# Patient Record
Sex: FEMALE | Race: ASIAN | NOT HISPANIC OR LATINO | Employment: FULL TIME | URBAN - METROPOLITAN AREA
[De-identification: names, ages, dates, MRNs, and addresses within clinical notes are randomized per-mention and may not be internally consistent; named-entity substitution may affect disease eponyms.]

---

## 2020-09-20 ASSESSMENT — ANXIETY QUESTIONNAIRES
7. FEELING AFRAID AS IF SOMETHING AWFUL MIGHT HAPPEN: NOT AT ALL
7. FEELING AFRAID AS IF SOMETHING AWFUL MIGHT HAPPEN: NOT AT ALL
3. WORRYING TOO MUCH ABOUT DIFFERENT THINGS: NOT AT ALL
GAD7 TOTAL SCORE: 2
5. BEING SO RESTLESS THAT IT IS HARD TO SIT STILL: NOT AT ALL
2. NOT BEING ABLE TO STOP OR CONTROL WORRYING: NOT AT ALL
1. FEELING NERVOUS, ANXIOUS, OR ON EDGE: SEVERAL DAYS
4. TROUBLE RELAXING: NOT AT ALL
6. BECOMING EASILY ANNOYED OR IRRITABLE: SEVERAL DAYS
GAD7 TOTAL SCORE: 2

## 2020-09-21 ENCOUNTER — OFFICE VISIT (OUTPATIENT)
Dept: FAMILY MEDICINE | Facility: CLINIC | Age: 30
End: 2020-09-21
Payer: COMMERCIAL

## 2020-09-21 VITALS
OXYGEN SATURATION: 97 % | TEMPERATURE: 98.5 F | DIASTOLIC BLOOD PRESSURE: 89 MMHG | SYSTOLIC BLOOD PRESSURE: 152 MMHG | HEART RATE: 82 BPM

## 2020-09-21 DIAGNOSIS — Z23 NEEDS FLU SHOT: ICD-10-CM

## 2020-09-21 DIAGNOSIS — R10.2 PELVIC PAIN IN FEMALE: Primary | ICD-10-CM

## 2020-09-21 DIAGNOSIS — Z30.09 COUNSELING FOR BIRTH CONTROL, ORAL CONTRACEPTIVES: ICD-10-CM

## 2020-09-21 RX ORDER — NAPROXEN 500 MG/1
TABLET ORAL
COMMUNITY
Start: 2017-06-01

## 2020-09-21 RX ORDER — MECLIZINE HCL 12.5 MG 12.5 MG/1
TABLET ORAL
COMMUNITY
Start: 2017-06-01

## 2020-09-21 SDOH — HEALTH STABILITY: MENTAL HEALTH: HOW OFTEN DO YOU HAVE A DRINK CONTAINING ALCOHOL?: 4 OR MORE TIMES A WEEK

## 2020-09-21 SDOH — HEALTH STABILITY: MENTAL HEALTH: HOW MANY STANDARD DRINKS CONTAINING ALCOHOL DO YOU HAVE ON A TYPICAL DAY?: 1 OR 2

## 2020-09-21 SDOH — HEALTH STABILITY: MENTAL HEALTH: HOW OFTEN DO YOU HAVE 6 OR MORE DRINKS ON ONE OCCASION?: LESS THAN MONTHLY

## 2020-09-21 ASSESSMENT — ANXIETY QUESTIONNAIRES: GAD7 TOTAL SCORE: 2

## 2020-09-21 ASSESSMENT — ENCOUNTER SYMPTOMS: ABDOMINAL PAIN: 1

## 2020-09-21 ASSESSMENT — PAIN SCALES - GENERAL: PAINLEVEL: NO PAIN (0)

## 2020-09-21 NOTE — PROGRESS NOTES
HPI       Leonor Sears is a 29 year old woman who presents to [facility] and with concerns of abdominal pain/cramping 2 days prior to her period. Patient states this has occurred over the past 5-6 years. Takes Lo-overal. Cramping primarily occurs at night, patient treats with naproxen and heat packs. Pain lasts about two days and stops abruptly. She states this minimally helps and the pain usually leaves her unable to sleep or move easily. She has a regular 28 day cycle with 4-5 day long menses. Unremarkable pelvic US performed 5-6 years ago.   Chief Complaint   Patient presents with     [facility]       ABDOMINAL   Pain     Onset: 5-6 years ago    Description:   Character: Cramping  Location: right lower quadrant left lower quadrant  Radiation: None    Intensity: severe    Progression of Symptoms:  intermittent    Accompanying Signs & Symptoms:  Fever/Chills?: No   Gas/Bloating: No   Dysuria:No   Hematuria: No   Nausea: No   Vomiting: No   Diarrhea:No   Constipation: NO       History:           Trauma: No   Previous similar pain: No    Previous tests done: Vaginal US    What makes it better?:  Naproxen, heat occasionally    Therapies Tried and outcome: Naproxen, heat packs    LMP:  Beginning of August 2020     Adherence and Exercise  Medication side effects: no  How often is a medication missed? Never  Exercise:walking  1 day/week for an average of 15-30 minutes     Problem, Medication and Allergy Lists were reviewed and updated if needed..    Patient is a new patient to this clinic and so  I reviewed/updated the Past Medical History, the Family History and the Social History .           Review of Systems:   Review of Systems     Gastrointestinal:  Positive for abdominal pain.   All other systems reviewed and are negative.              Physical Exam:     Vitals:    09/21/20 1235   BP: (!) 152/89   Pulse: 82   Temp: 98.5  F (36.9  C)   TempSrc: Oral   SpO2: 97%     There is no  height or weight on file to calculate BMI.  Vital signs normal except /89     Physical Exam  HENT:      Head: Normocephalic.   Eyes:      Extraocular Movements: Extraocular movements intact.      Pupils: Pupils are equal, round, and reactive to light.   Neck:      Musculoskeletal: Normal range of motion and neck supple.   Cardiovascular:      Rate and Rhythm: Normal rate and regular rhythm.      Pulses: Normal pulses.      Heart sounds: Normal heart sounds.   Pulmonary:      Effort: Pulmonary effort is normal.      Breath sounds: Normal breath sounds.   Abdominal:      General: Bowel sounds are normal.      Palpations: Abdomen is soft.   Musculoskeletal: Normal range of motion.   Skin:     General: Skin is warm and dry.   Neurological:      General: No focal deficit present.      Mental Status: She is alert and oriented to person, place, and time.   Psychiatric:         Mood and Affect: Mood normal.         Behavior: Behavior normal.           Results:   US pending  Assessment and Plan     Leonor was seen today for establish care.    Diagnoses and all orders for this visit:    Pelvic pain in female  -     US Pelvic Complete with Transvaginal; Future    Needs flu shot  -     FLU VAC PRESRV FREE QUAD SPLIT VIR 3+YRS IM    Counseling for birth control, oral contraceptives  -     norgestrel-ethinyl estradiol (LO/OVRAL) 0.3-30 MG-MCG tablet; Take 1 tablet by mouth daily        Medications Discontinued During This Encounter   Medication Reason     norgestrel-ethinyl estradiol (LO/OVRAL) 0.3-30 MG-MCG tablet Reorder     Options for treatment and follow-up care were reviewed with the patient. Leonor Sears  engaged in the decision making process and verbalized understanding of the options discussed and agreed with the final plan.  Candida Gilliam RN. PHN, [facility] DNP student  I was present with the NP student who participated in the service and in the documentation of the services  provided. I have verified the history and personally performed the physical exam and medical decision making, as documented by the student and edited by me.  HERB Diamond, CNP

## 2020-09-21 NOTE — PATIENT INSTRUCTIONS

## 2020-09-21 NOTE — NURSING NOTE
Chief Complaint   Patient presents with     [facility]     Rebecca Escobedo, A 1:16 PM  9/21/2020

## 2020-09-22 ENCOUNTER — ANCILLARY PROCEDURE (OUTPATIENT)
Dept: ULTRASOUND IMAGING | Facility: CLINIC | Age: 30
End: 2020-09-22
Attending: NURSE PRACTITIONER
Payer: COMMERCIAL

## 2020-09-22 DIAGNOSIS — R10.2 PELVIC PAIN IN FEMALE: ICD-10-CM

## 2020-09-27 ENCOUNTER — MYC REFILL (OUTPATIENT)
Dept: FAMILY MEDICINE | Facility: CLINIC | Age: 30
End: 2020-09-27

## 2020-09-27 DIAGNOSIS — Z30.09 COUNSELING FOR BIRTH CONTROL, ORAL CONTRACEPTIVES: ICD-10-CM

## 2020-10-13 ENCOUNTER — VIRTUAL VISIT (OUTPATIENT)
Dept: INTERNAL MEDICINE | Facility: CLINIC | Age: 30
End: 2020-10-13
Payer: COMMERCIAL

## 2020-10-13 DIAGNOSIS — N94.6 DYSMENORRHEA: Primary | ICD-10-CM

## 2020-10-13 PROBLEM — M26.609 TMJ (TEMPOROMANDIBULAR JOINT SYNDROME): Status: ACTIVE | Noted: 2019-11-11

## 2020-10-13 PROCEDURE — 99203 OFFICE O/P NEW LOW 30 MIN: CPT | Mod: 95 | Performed by: PATHOLOGY

## 2020-10-13 RX ORDER — LEVONORGESTREL / ETHINYL ESTRADIOL AND ETHINYL ESTRADIOL 150-30(84)
1 KIT ORAL DAILY
Qty: 91 TABLET | Refills: 3 | Status: SHIPPED | OUTPATIENT
Start: 2020-10-13 | End: 2021-06-28 | Stop reason: ALTCHOICE

## 2020-10-13 NOTE — NURSING NOTE
Chief Complaint   Patient presents with     Refill Request     Pt would like refills of OCP     KRISHNA Tran at 8:02 AM sign on 10/13/2020

## 2020-10-13 NOTE — PROGRESS NOTES
"This patient is being evaluated via a billable video visit as an alternative to an in-person visit.       The patient has been notified of following:     \"This video visit will be conducted via a call between you and your physician/provider. We have found that certain health care needs can be provided without the need for an in-person physical exam.  This service lets us provide the care you need with a video conversation.  If a prescription is necessary we can send it directly to your pharmacy.  If lab work is needed we can place an order for that and you can then stop by our lab to have the test done at a later time. If during the course of the call the physician/provider feels a video visit is not appropriate, you will not be charged for this service.\"     Patient has given verbal consent for virtual video visit? Yes  Did patient initiate this virtual visit? Yes    Person spoken to:  Patient    This was a synchronous virtual visit  Location of patient: home  Location of physician:  home office  Department name:  Medicine  Mode of communication:  Video Conference via [employer]    Time video initiated:  8:25  Time video ended:  8:47  Total length of video visit: 22 mins    Patients: if you have questions or concerns about this progress note, please discuss them with the provider at at a future office visit.      [facility]         HPI:      Leonor Sears is a 29 year old female with history of frequent PVCs s/p ablation, dysmenorrhea, and migraine without aura who presents to discuss ongoing dysmenorrhea and OCP options.    Dysmenorrhea has been an issue for the past 5-6 years. Symptoms consist of pelvic pain/cramping starting 2-3 days prior to her period and stopping abruptly early on in her period (usually pain lasts 2 days total). Has been taking combo OCP Lo/Ovral which she is tolerating well but does not like the fact that she is still having dysmenorrhea. Patient treats cramps with naproxen 500 mg and " heat packs with some relief. The pain keeps her up at night and makes it difficult to function with daily activities. Cycles are regular 28 days with 4-5 days of menses. Low concern for STI, patient declines screening, no symptoms which would raise concern for STI. Pelvic US was unremarkable, last done on visit to NP 9/21/20 and also was normal when done 5-6 years ago for pelvic pain. Previous contraceptives used includes the mini pill which she was on for about 3-4 months, states that she did not like the stringency of having to take this on a very regular schedule. Tried the implant (nexplanon?) before and did not like this. Would consider an IUD but prefers to stay on an OCP if possible. She is a never smoker, no personal history of blood clots, not diagnosis of HTN, headaches are associated with period -- migraine vs tension headache -- and are without aura though does have associated photophobia.     Patient had no additional concerns.     Problem, Medication and Allergy Lists were reviewed and are current.  Patient is an established patient of this clinic.         Review of Systems:   10-point ROS was negative other than as per HPI.    ROS  I have personally reviewed and updated the complete ROS on the day of the visit.           Physical Exam:   This was a virtual visit.   No vitals were taken.      Video visit.   Patient sitting upright, conversant and extremely pleasant, appears comfortable and in no acute distress. Speech fluent.       Results:   Labs/tests ordered this encounter: none    Assessment and Plan     Leonor was seen today for dysmenorrhea and to discuss OCP options.    Diagnoses and all orders for this visit:    Dysmenorrhea  Will keep patient on combo OCP per her preference. Would likely have benefit from switching to an extended-cycle OCP; prescribed Levonorgestrel/ethinyl estradiol 0.15 mg/0.03 mg and patient can expect to have menses very 12 weeks rather than every 4 weeks. Given the  cyclic nature of the pelvic pain and timing correlating with onset of menses this could be PMS-type pain however have high suspicion that this could in fact be endometriosis. Transvaginal US done less than one month ago with no evidence of ovarian or bowel endometrioma, nor was uterus enlarged which would indicate adenomyosis, however cannot rule out deeply infiltrating endometriosis which would only be seen on MR imaging. Given recent normal TVUS will defer further workup for now and try switching OCP. Patient to follow up in 6-12 months or sooner if concerns, especially if there has been no improvement or worsening in pelvic pain.         -     Discontinue Lo/Ovral OCP  -     Start levonorgest-eth estrad 91-Day (SEASONIQUE) 0.15-0.03 &0.01 MG tablet; Take 1 tablet by mouth daily  -     Return to clinic in 6-12 months or sooner if needed    Options for treatment and follow-up care were reviewed with the patient. Leonor Sears engaged in the decision making process and verbalized understanding of the options discussed and agreed with the final plan.      Whitney Calix MD MPH, PGY-6  Internal Medicine  p:[phone]  October 13, 2020    Teaching Physician Note:    I, Dr. Kincaid, was immediately accessible to the resident, and agree with the residents's findings and plan of care, as documented in the resident's note.     Ellen Kincaid M.D.  Internal Medicine  [facility]         Patients: if you have questions or concerns about this progress note, please discuss them with the provider at at a future office visit.

## 2020-10-13 NOTE — PROGRESS NOTES
"Video Visit Technology for this patient: Juliann not working, patient has smart device, please try [employer] Video with patient [phone]     Leonor Sears is a 29 year old female who is being evaluated via a billable video visit.      The patient has been notified of following:     \"This video visit will be conducted via a call between you and your physician/provider. We have found that certain health care needs can be provided without the need for an in-person physical exam.  This service lets us provide the care you need with a video conversation.  If a prescription is necessary we can send it directly to your pharmacy.  If lab work is needed we can place an order for that and you can then stop by our lab to have the test done at a later time.    Video visits are billed at different rates depending on your insurance coverage.  Please reach out to your insurance provider with any questions.    If during the course of the call the physician/provider feels a video visit is not appropriate, you will not be charged for this service.\"    Patient has given verbal consent for Video visit? Yes  How would you like to obtain your AVS? MyChart  If you are dropped from the video visit, the video invite should be resent to: Text to cell phone: [phone]   Will anyone else be joining your video visit? No           "

## 2021-01-04 ENCOUNTER — HEALTH MAINTENANCE LETTER (OUTPATIENT)
Age: 31
End: 2021-01-04

## 2021-06-28 ENCOUNTER — MYC MEDICAL ADVICE (OUTPATIENT)
Dept: FAMILY MEDICINE | Facility: CLINIC | Age: 31
End: 2021-06-28

## 2021-06-28 ENCOUNTER — OFFICE VISIT (OUTPATIENT)
Dept: FAMILY MEDICINE | Facility: CLINIC | Age: 31
End: 2021-06-28
Payer: COMMERCIAL

## 2021-06-28 VITALS
SYSTOLIC BLOOD PRESSURE: 129 MMHG | HEIGHT: 63 IN | HEART RATE: 87 BPM | WEIGHT: 145.3 LBS | OXYGEN SATURATION: 98 % | DIASTOLIC BLOOD PRESSURE: 82 MMHG | BODY MASS INDEX: 25.75 KG/M2

## 2021-06-28 DIAGNOSIS — Z83.3 FAMILY HISTORY OF DIABETES MELLITUS: Primary | ICD-10-CM

## 2021-06-28 DIAGNOSIS — G43.009 MIGRAINE WITHOUT AURA AND WITHOUT STATUS MIGRAINOSUS, NOT INTRACTABLE: ICD-10-CM

## 2021-06-28 DIAGNOSIS — K21.00 GASTROESOPHAGEAL REFLUX DISEASE WITH ESOPHAGITIS WITHOUT HEMORRHAGE: ICD-10-CM

## 2021-06-28 DIAGNOSIS — K21.00 GASTROESOPHAGEAL REFLUX DISEASE WITH ESOPHAGITIS WITHOUT HEMORRHAGE: Primary | ICD-10-CM

## 2021-06-28 DIAGNOSIS — R03.0 ELEVATED BP WITHOUT DIAGNOSIS OF HYPERTENSION: ICD-10-CM

## 2021-06-28 DIAGNOSIS — Z30.09 ENCOUNTER FOR OTHER GENERAL COUNSELING OR ADVICE ON CONTRACEPTION: ICD-10-CM

## 2021-06-28 DIAGNOSIS — Z83.3 FAMILY HISTORY OF DIABETES MELLITUS: ICD-10-CM

## 2021-06-28 LAB
ALBUMIN SERPL-MCNC: 3.6 G/DL (ref 3.4–5)
ALP SERPL-CCNC: 30 U/L (ref 40–150)
ALT SERPL W P-5'-P-CCNC: 46 U/L (ref 0–50)
ANION GAP SERPL CALCULATED.3IONS-SCNC: 6 MMOL/L (ref 3–14)
AST SERPL W P-5'-P-CCNC: 38 U/L (ref 0–45)
BASOPHILS # BLD AUTO: 0 10E9/L (ref 0–0.2)
BASOPHILS NFR BLD AUTO: 0.2 %
BILIRUB SERPL-MCNC: 0.3 MG/DL (ref 0.2–1.3)
BUN SERPL-MCNC: 9 MG/DL (ref 7–30)
CALCIUM SERPL-MCNC: 8.6 MG/DL (ref 8.5–10.1)
CHLORIDE SERPL-SCNC: 106 MMOL/L (ref 94–109)
CO2 SERPL-SCNC: 24 MMOL/L (ref 20–32)
CREAT SERPL-MCNC: 0.8 MG/DL (ref 0.52–1.04)
DIFFERENTIAL METHOD BLD: ABNORMAL
EOSINOPHIL # BLD AUTO: 0.1 10E9/L (ref 0–0.7)
EOSINOPHIL NFR BLD AUTO: 1.7 %
ERYTHROCYTE [DISTWIDTH] IN BLOOD BY AUTOMATED COUNT: 12 % (ref 10–15)
GFR SERPL CREATININE-BSD FRML MDRD: >90 ML/MIN/{1.73_M2}
GLUCOSE SERPL-MCNC: 81 MG/DL (ref 70–99)
HCG UR QL: NEGATIVE
HCT VFR BLD AUTO: 44.8 % (ref 35–47)
HGB BLD-MCNC: 15.6 G/DL (ref 11.7–15.7)
IMM GRANULOCYTES # BLD: 0.1 10E9/L (ref 0–0.4)
IMM GRANULOCYTES NFR BLD: 1.3 %
LYMPHOCYTES # BLD AUTO: 0.9 10E9/L (ref 0.8–5.3)
LYMPHOCYTES NFR BLD AUTO: 19.9 %
MCH RBC QN AUTO: 33.4 PG (ref 26.5–33)
MCHC RBC AUTO-ENTMCNC: 34.8 G/DL (ref 31.5–36.5)
MCV RBC AUTO: 96 FL (ref 78–100)
MONOCYTES # BLD AUTO: 0.4 10E9/L (ref 0–1.3)
MONOCYTES NFR BLD AUTO: 7.9 %
NEUTROPHILS # BLD AUTO: 3.2 10E9/L (ref 1.6–8.3)
NEUTROPHILS NFR BLD AUTO: 69 %
NRBC # BLD AUTO: 0 10*3/UL
NRBC BLD AUTO-RTO: 0 /100
PLATELET # BLD AUTO: 145 10E9/L (ref 150–450)
POTASSIUM SERPL-SCNC: 3.8 MMOL/L (ref 3.4–5.3)
PROT SERPL-MCNC: 7.4 G/DL (ref 6.8–8.8)
RBC # BLD AUTO: 4.67 10E12/L (ref 3.8–5.2)
SODIUM SERPL-SCNC: 137 MMOL/L (ref 133–144)
WBC # BLD AUTO: 4.6 10E9/L (ref 4–11)

## 2021-06-28 PROCEDURE — 99204 OFFICE O/P NEW MOD 45 MIN: CPT | Performed by: FAMILY MEDICINE

## 2021-06-28 PROCEDURE — 85025 COMPLETE CBC W/AUTO DIFF WBC: CPT | Performed by: PATHOLOGY

## 2021-06-28 PROCEDURE — 36415 COLL VENOUS BLD VENIPUNCTURE: CPT | Performed by: PATHOLOGY

## 2021-06-28 PROCEDURE — 81025 URINE PREGNANCY TEST: CPT | Performed by: PATHOLOGY

## 2021-06-28 PROCEDURE — 80053 COMPREHEN METABOLIC PANEL: CPT | Performed by: PATHOLOGY

## 2021-06-28 RX ORDER — FAMOTIDINE 40 MG/1
40 TABLET, FILM COATED ORAL DAILY
Qty: 30 TABLET | Refills: 0 | Status: SHIPPED | OUTPATIENT
Start: 2021-06-28 | End: 2021-07-22

## 2021-06-28 RX ORDER — FLASH GLUCOSE SENSOR
1 KIT MISCELLANEOUS
Qty: 2 EACH | Refills: 5 | Status: SHIPPED | OUTPATIENT
Start: 2021-06-28 | End: 2021-06-30

## 2021-06-28 RX ORDER — ACETAMINOPHEN AND CODEINE PHOSPHATE 120; 12 MG/5ML; MG/5ML
0.35 SOLUTION ORAL DAILY
Qty: 90 TABLET | Refills: 3 | Status: SHIPPED | OUTPATIENT
Start: 2021-06-28

## 2021-06-28 RX ORDER — FLASH GLUCOSE SCANNING READER
1 EACH MISCELLANEOUS ONCE
Qty: 1 EACH | Refills: 0 | Status: SHIPPED | OUTPATIENT
Start: 2021-06-28 | End: 2021-06-30

## 2021-06-28 ASSESSMENT — MIFFLIN-ST. JEOR: SCORE: 1356.21

## 2021-06-28 NOTE — PATIENT INSTRUCTIONS
Patient Education     Birth Control Choices  Birth control keeps you from getting pregnant during sex. There are many types of birth control. Some are more effective than others. New types are being tested all the time. Your healthcare provider can help you decide which type of birth control is best for you. But no matter which type you choose, you and your partner must use it the right way each time you have sex. Some of the most common types are described below.  Condom  A condom is a thin covering that fits over the penis. (The female condom fits inside the vagina.) A condom catches sperm that come out of the penis during sex.  Spermicide  Spermicide is a gel, foam, cream, tablet, or sponge (although the sponge has barrier properties in addition to spermicidal properties). It is put in the vagina before sex to kill sperm.  Diaphragm and cervical cap  Diaphragms and cervical caps are round rubber cups that keep sperm out of the uterus. They also hold spermicide in place.  Intrauterine device (IUD)  An IUD is a small device that is placed in the uterus by a healthcare provider to prevent pregnancy.  The pill  The birth control pill is taken daily. It contains hormones that stop a woman s body from releasing an egg each month.  Other hormones  Hormones that stop a woman s egg from being released each month can be delivered in other ways. These include injection, implant, patch, or vaginal ring.  Other choices  Here are some other birth control methods:    Male sterilization (vasectomy). This is surgery that ties off or cuts the tubes (vas deferens) in the testes. This is done so sperm can't come out when the man ejaculates.    Female sterilization. This is surgery to block or cut the woman's fallopian tubes. It can be done by placing a tool into the uterus (hysteroscopy). This is done to place small coils into the fallopian tubes. The FDA has placed restrictions on this method. If you are interested in this  method, talk with your healthcare provider about possible risks. Female sterilization can also be done through the belly (laparoscopy) to block the tubes. Or to remove part or all of the tubes.    Withdrawal method. This is when the male doesn't ejaculate into the vagina. Instead he withdraws his penis just before he ejaculates. But the failure rate for this method ranges from 22% to 28%.    Fertility awareness method. This is when a woman keeps track of her fertile days. She only has sex at times when she is not likely to get pregnant. This method is hard for women who have irregular periods.  Emergency contraception (EC)  Emergency contraception can help prevent pregnancy after unprotected sex. Hormone pills (morning after pills) are available over the counter to anyone. A second type of EC, a copper IUD, needs to be inserted by a trained healthcare provider. Either type of EC can be used up to 5 days after sex. But it should be taken as soon as possible. The sooner it is used after unprotected sex, the more likely it is to be effective. EC will not work if you re already pregnant.  Things to consider  Think about the following:    Choose a type of birth control that is easy for you to use.    Read the package and follow your healthcare provider's instructions to learn to use your birth control the right way.    Most forms of birth control don't protect you from sexually transmitted infections (STIs). To protect against STIs, always use a latex condom. If you are allergic to latex, a nonlatex condom may offer some protection.  [employer] last reviewed this educational content on 6/1/2019 2000-2021 [employer]. All rights reserved. This information is not intended as a substitute for professional medical care. Always follow your healthcare professional's instructions.           Patient Education     Birth Control: IUD (Intrauterine Device)    The IUD (intrauterine device) is small, flexible, and  T-shaped. A trained healthcare provider places it in the uterus. The IUD is one of the most effective birth control methods. It's also reversible. This means it can be removed at any time by a trained healthcare provider. New IUDs are safe and don't have the risks of older types of IUDs.   Pregnancy rates  Talk to your healthcare provider about the effectiveness of this birth control method.  Types of IUDs  IUD insertion is done in the healthcare provider s office. Two types of IUDs are available:    The copper IUD releases a small amount of copper into the uterus. The copper makes it harder for sperm to reach the egg. The device works for at least 10 years.    The progestin IUD releases a hormone called progestin. It causes changes in the uterus to help prevent pregnancy. The device works for 3 to 5 years, depending on which device is chosen. It may be recommended for women who have anemia or heavy and painful periods.  IUDs have thin strings that hang from the opening of the uterus into the vagina. This lets you check that the IUD stays in place.   Things to know about IUDs    IUDs can be used by women who have never been pregnant or by women with a history of sexually transmitted infections (STIs) or tubal pregnancy.    It won't move from the uterus to any other part of the body.    There is a slight risk of the device coming out of the vagina (expulsion).    It may not work in women who have an abnormally shaped uterus.    A copper IUD may cause heavier periods and cramping.    Progestin IUD may cause light periods or no periods at all (irregular bleeding or spotting is possible and normal during first 3 to 6 months).    If you get a sexually transmitted infection with an IUD in place, symptoms may be more severe.    What to report to your healthcare provider  Be sure your healthcare provider knows if you have:    A sexually transmitted infection (STI) or possible STI    Liver problems    Blood clots (for  progestin IUD only)    Breast cancer or a history of breast cancer (progestin IUD only)  [employer] last reviewed this educational content on 5/1/2020 2000-2021 [employer]. All rights reserved. This information is not intended as a substitute for professional medical care. Always follow your healthcare professional's instructions.           Patient Education     Nexplanon Drug Implant 68 mg  Uses  For birth control.  Instructions  Two bandages will cover the area where the reno is placed. Leave the larger, outer bandage on for 24 hours. Leave the smaller bandage in place for 3-5 days, as instructed by your doctor.  Keep the bandages clean and dry.  This medicine is normally placed under the skin of the upper arm, usually in the arm you do not write with.  A negative pregnancy test may be needed before receiving this medicine.  A second form of birth control may be needed for the first week after the reno is placed. Ask your doctor or pharmacist about the need for back-up birth control.  Keep the card that includes the date and place the reno was inserted.  The reno must be removed after 3 years.  This medicine may cause dark patches to appear on your face. Avoid sunlight and use sunscreen lotion to minimize further darkening of these skin patches.  Avoid prolonged or excessive sunlight exposure. Use sunscreen lotion with SPF 15 or higher.  Please tell your doctor and pharmacist about all the medicines you take. Include both prescription and over-the-counter medicines. Also tell them about any vitamins, herbal medicines, or anything else you take for your health.  It is very important that you follow your doctor's instructions for all blood tests.  It is very important that you keep all appointments for medical exams and tests while on this medicine.  Cautions  Some patients taking this medicine have experienced serious side effects. Please speak with your doctor to understand the risks and benefits  associated with this medicine.  This medicine is associated with an increased risk for serious blood clots. Speak with your doctor about the benefits and risks from using this medicine.  This medicine is associated with an increased risk of serious heart problems, heart attack, and stroke. Please speak with your doctor about the risks and benefits of using this medicine. Contact your doctor immediately if you experience chest pain or difficulty breathing.  Tell your doctor and pharmacist if you ever had an allergic reaction to a medicine. Symptoms of an allergic reaction can include trouble breathing, skin rash, itching, swelling, or severe dizziness.  This medicine may not work as well in women who are very overweight. Speak to your doctor about any need to reduce weight.  Your ability to stay alert or to react quickly may be impaired by this medicine. Do not drive or operate machinery until you know how this medicine will affect you.  Please check with your doctor before drinking alcohol while on this medicine.  Avoid smoking while on this medicine. Smoking may increase your risk for stroke, heart attack, blood clots, high blood pressure, and other diseases of the heart and blood vessels.  Ask your doctor to show you how to perform a self breast examination. You should check your breasts once a month and report any changes to your doctor.  Talk to your doctor about getting a complete physical exam every year while on this medicine.  Check regularly to make sure you can feel the reno under the skin. Contact your doctor right away if you can not feel it, or if it feels bent or broken.  Call the doctor if there are any signs of confusion or unusual changes in behavior.  Tell the doctor or pharmacist if you are pregnant, planning to be pregnant, or breastfeeding.  Do not use this medicine if you are pregnant. If you become pregnant while on this medicine, contact your doctor immediately.  This medicine does not protect  you or your partner against sexually transmitted diseases.  Do not take Ty's wort while on this medicine.  Ask your pharmacist if this medicine can interact with any of your other medicines. Be sure to tell them about all the medicines you take.  Please tell all your doctors and dentists that you are on this medicine before they provide care.  Do not start or stop any other medicines without first speaking to your doctor or pharmacist.  Seek medical attention if you see any signs of a serious infection. These signs include pain, increasing redness or pus where this medicine is being used.  Side Effects  The following is a list of some common side effects from this medicine. Please speak with your doctor about what you should do if you experience these or other side effects.    acne    bloating    breast pain or swelling    dizziness    hair loss    headaches    high blood pressure    brown colored patches on the face    nausea    stomach upset or abdominal pain    vaginal bleeding or spotting between periods    vaginal itching or discharge    weight gain  Call your doctor or get medical help right away if you notice any of these more serious side effects:    increased risk of a blood clot    chest pain    confusion    coughing up blood or vomit that looks like coffee grounds    depression or feeling sad    fainting    severe or persistent headache    fast or irregular heart beats    jaw pain    sudden leg pain, swelling, warmth or redness    mood changes    shortness of breath    stomach pain    symptoms of stroke (such as one-sided weakness, slurred speech, confusion)    sweating    unusual or unexplained tiredness or weakness    dark urine    cramping of the uterus or bleeding from the vagina    blurring or changes of vision    pain, heat, swelling or redness at the incision site    yellowing of the eyes    yellowing of the skin  A few people may have an allergic reactions to this medicine. Symptoms can  include difficulty breathing, skin rash, itching, swelling, or severe dizziness. If you notice any of these symptoms, seek medical help quickly.  Extra  Please speak with your doctor, nurse, or pharmacist if you have any questions about this medicine.  [email or web address]  IMPORTANT NOTE: This document tells you briefly how to take your medicine, but it does not tell you all there is to know about it.Your doctor or pharmacist may give you other documents about your medicine. Please talk to them if you have any questions.Always follow their advice. There is a more complete description of this medicine available in English.Scan this code on your smartphone or tablet or use the web address below. You can also ask your pharmacist for a printout. If you have any questions, please ask your pharmacist.     2021 [employer].           Patient Education     Kyleena Intrauterine System 19.5 mg  Uses  For birth control.  Instructions  A negative pregnancy test may be needed before receiving this medicine.  A second form of birth control may be needed for the first week after the reno is placed. Ask your doctor or pharmacist about the need for back-up birth control.  Please tell your doctor and pharmacist about all the medicines you take. Include both prescription and over-the-counter medicines. Also tell them about any vitamins, herbal medicines, or anything else you take for your health.  The IUD should be removed after 5 years. After removal, a new IUD can be inserted if treatment is to be continued.  It is very important that you keep all appointments for medical exams and tests while on this medicine.  Cautions  Some patients taking this medicine have experienced serious side effects. Please speak with your doctor to understand the risks and benefits associated with this medicine.  Tell your doctor and pharmacist if you ever had an allergic reaction to a medicine. Symptoms of an allergic  reaction can include trouble breathing, skin rash, itching, swelling, or severe dizziness.  Ask your doctor to show you how to perform a self breast examination. You should check your breasts once a month and report any changes to your doctor.  Talk to your doctor about getting a complete physical exam every year while on this medicine.  Check regularly to make sure the IUD is in the proper place. Contact your doctor right away if the IUD comes out or if you can not feel the threads.  Tell the doctor or pharmacist if you are pregnant, planning to be pregnant, or breastfeeding.  Do not use this medicine if you are pregnant. If you become pregnant while on this medicine, contact your doctor immediately.  This medicine does not protect you or your partner against sexually transmitted diseases.  Ask your pharmacist if this medicine can interact with any of your other medicines. Be sure to tell them about all the medicines you take.  Please tell all your doctors and dentists that you are on this medicine before they provide care.  Do not start or stop any other medicines without first speaking to your doctor or pharmacist.  Side Effects  The following is a list of some common side effects from this medicine. Please speak with your doctor about what you should do if you experience these or other side effects.    breast pain or swelling    nausea    cramping of the uterus or bleeding from the vagina    vaginal bleeding or spotting between periods    weight gain  Call your doctor or get medical help right away if you notice any of these more serious side effects:    severe abdominal or pelvic pain    breast lumps    depression or feeling sad    fever or chills    severe or persistent headache    mood changes    pain during sexual intercourse    dark urine    vaginal itching or discharge    severe or persistent vomiting    yellowing of the eyes    yellowing of the skin  A few people may have an allergic reactions to this  medicine. Symptoms can include difficulty breathing, skin rash, itching, swelling, or severe dizziness. If you notice any of these symptoms, seek medical help quickly.  Extra  Please speak with your doctor, nurse, or pharmacist if you have any questions about this medicine.  [email or web address]  IMPORTANT NOTE: This document tells you briefly how to take your medicine, but it does not tell you all there is to know about it.Your doctor or pharmacist may give you other documents about your medicine. Please talk to them if you have any questions.Always follow their advice. There is a more complete description of this medicine available in English.Scan this code on your smartphone or tablet or use the web address below. You can also ask your pharmacist for a printout. If you have any questions, please ask your pharmacist.     2021 [employer].

## 2021-06-28 NOTE — NURSING NOTE
Chief Complaint   Patient presents with     Abdominal Pain     intermittent, epigastric pain after dinner Saturday night, associated nausea and constipation, no vomiting, no diarrhea, hx of reflux       Brooke Calzada, EMT at 11:04 AM on 6/28/2021

## 2021-06-28 NOTE — Clinical Note
Need Prior auth for Richy she will return on 7/26. She also needs PAP  Best wishes,  Dany Coleman MD

## 2021-06-28 NOTE — PROGRESS NOTES
Assessment & Plan   Leeann is a 30-year-old with history of gastroesophageal reflux remotely, history of dysmenorrhea improved on Seasonique however with intermittent migraine headache and increased blood pressure we have discussed discontinuation of combined oral contraceptives.   Today her pain is down to a 3 out of 10 in the epigastrium.  I reviewed avoidance of high fructose corn syrup and other additives decrease alcohol and contact us if her symptoms or not improved.   Gastroesophageal reflux disease with esophagitis without hemorrhage   Today she presented because of pain in the left upper abdomen shortly after drinking a racheal which may have been sensitivities to the product she used for the Racheal or direct irritation from alcohol.  With her past history of gastroesophageal reflux we will treat with famotidine 40 mg daily for 2 weeks and then as needed and also test for H. pylori.  She requested labs which I think is reasonable we will check comprehensive metabolic and CBC.  I do not think we need to image her abdomen at this time as pain is 3/10.  - Comprehensive metabolic panel  - CBC with platelets differential  - Helicobacter pylori Antigen Stool  - famotidine (PEPCID) 40 MG tablet  Dispense: 30 tablet; Refill: 0    Encounter for other general counseling or advice on contraception   I also reviewed discontinuation of her oral contraceptive seasonique and reviewed contraceptive methods she chose Kyleena.  We will do a pregnancy test today switch her over to progesterone only birth control in the meantime until we can have Kyleena approved for her insurance and schedule a follow-up for July 26 for placement of IUD.  She will have a pregnancy test done that day as well and use agreeable liable method of contraception prior such as Micronor.   - HCG Qual, Urine ([id number])  - norethindrone (MICRONOR) 0.35 MG tablet  Dispense: 90 tablet; Refill: 3    Family history of diabetes mellitus  She has a  "strong Family HX of diabetes and requested a continue glucose meter. I discussed this likely will not be covered, she is interested in self pay.    - Continuous Blood Gluc  (FREESTYLE HAMMAD 14 DAY READER) BRITNEY  Dispense: 1 each; Refill: 0  - Continuous Blood Gluc Sensor (FREESTYLE HAMMAD 14 DAY SENSOR) MISC  Dispense: 2 each; Refill: 5    Migraine without aura and without status migrainosus, not intractable  Discontinue Combined oral contraceptives   Elevated BP without diagnosis of hypertension  Recheck improved               BMI:   Estimated body mass index is 25.58 kg/m  as calculated from the following:    Height as of this encounter: 1.605 m (5' 3.19\").    Weight as of this encounter: 65.9 kg (145 lb 4.8 oz).     BMI in range    45 minutes spent on the date of the encounter doing chart review, history, exam, diagnostics review, documentation, counseling and coordination of cares as noted. ( new patient_    Return in about 4 weeks (around 7/26/2021) for kyleena.    Dany Coleman MD  [facility] [place]    Pauly Bradshaw is a 30 year old who presents for the following health issues     HPI   Leonor Sears is 30-year-old with history of gastroesophageal reflux remotely, history of dysmenorrhea improved on Seasonique presents for evaluation of left upper quadrant epigastric pain onset on Saturday.  Prior to the onset she prepared a hiram made with watermelon , tequila,  bottled lime juice prior to the onset of the pain 8/10. Saturday night onset 6:30 pm reflux, nausea no fever or diarrhea. Intermittent symptoms  Lying on left side assisted. No new foods tried tums without assistance.  She as a HX of GERD stopped after removing gluten from diet.  Sometimes will have migraines with sugary food, light senstivity. Pain in the left and right upper abdomen.  Dysmenorrhea improved on Seasonique but has noted her blood pressures been increasing.  She has a family history " of migraines and previous stroke therefore understands it may be worrisome to continue on oral contraceptives containing estrogen.  She is interested in discussing alternatives for contraception and after our discussion is interested in Kyleena.  She indicates she previously did an aggressive workout with martial arts therefore is not certain she wants an implant in her arm and is worried about scarring from the Nexplanon.  There is no chance of pregnancy at this time.  She is willing to follow-up in a few weeks for insertion of Kyleena and would like to switch to progesterone only birth control in the meantime in order to assist with blood pressure.  Her blood pressure was improved at the end of the appointment.   She requested a continuous glucose meter as she has a strong family HX of diabetes. I discussed this likely will not be covered by insurance.          Labs reviewed in EPIC  BP Readings from Last 3 Encounters:   06/28/21 129/82   09/21/20 (!) 152/89    Wt Readings from Last 3 Encounters:   06/28/21 65.9 kg (145 lb 4.8 oz)                  Patient Active Problem List   Diagnosis     TMJ (temporomandibular joint syndrome)     Migraine without aura and without status migrainosus, not intractable     Family history of diabetes mellitus     Gastroesophageal reflux disease with esophagitis without hemorrhage     Elevated BP without diagnosis of hypertension     Past Surgical History:   Procedure Laterality Date     Cardiac ablation (2013)         Social History     Tobacco Use     Smoking status: Never Smoker     Smokeless tobacco: Never Used   Substance Use Topics     Alcohol use: Yes     Alcohol/week: 14.0 standard drinks     Types: 14 Glasses of wine per week     Frequency: 4 or more times a week     Drinks per session: 1 or 2     Binge frequency: Less than monthly     Family History   Problem Relation Age of Onset     No Known Problems Mother      No Known Problems Father      Cerebrovascular Disease  "Maternal Grandmother      Cerebrovascular Disease Maternal Grandfather          Current Outpatient Medications   Medication Sig Dispense Refill     Continuous Blood Gluc  (FREESTYLE HAMMAD 14 DAY READER) BRITNEY 1 each once for 1 dose Use to read blood sugars as per 's instructions. 1 each 0     Continuous Blood Gluc Sensor (FREESTYLE HAMMAD 14 DAY SENSOR) MISC 1 each every 14 days Change every 14 days. 2 each 5     famotidine (PEPCID) 40 MG tablet Take 1 tablet (40 mg) by mouth daily For a minimum of 2 weeks then prn if needed 30 tablet 0     meclizine (ANTIVERT) 12.5 MG tablet        naproxen (NAPROSYN) 500 MG tablet        norethindrone (MICRONOR) 0.35 MG tablet Take 1 tablet (0.35 mg) by mouth daily 90 tablet 3     Allergies   Allergen Reactions     Gluten Meal Cramps, Diarrhea, GI Disturbance, Hives, Itching and Rash     Recent Labs   Lab Test 06/28/21  1222   ALT 46   CR 0.80   GFRESTIMATED >90   GFRESTBLACK >90   POTASSIUM 3.8      Review of Systems         Objective    /82   Pulse 87   Ht 1.605 m (5' 3.19\")   Wt 65.9 kg (145 lb 4.8 oz)   LMP 04/28/2021   SpO2 98%   Breastfeeding No   BMI 25.58 kg/m    Body mass index is 25.58 kg/m .  Physical Exam   GENERAL: healthy, alert and no distress, appears well and fit  EYES: Eyes grossly normal to inspection, PERRL and conjunctivae and sclerae normal  HENT: deferred wearing mask  NECK: no adenopathy, no asymmetry, masses, or scars and thyroid normal to palpation  RESP: lungs clear to auscultation - no rales, rhonchi or wheezes  CV: regular rate and rhythm, normal S1 S2, no S3 or S4, no murmur, click or rub, no peripheral edema ABDOMEN: soft, non-tender except mild in epigastrium, no hepatosplenomegaly, no masses and bowel sounds normal No guarding no rebound  MS: no gross musculoskeletal defects noted, no edema, good muscle tone  SKIN: no suspicious lesions or rashes  NEURO: Normal strength and tone, mentation intact and speech " normal  PSYCH: mentation appears normal, affect normal/bright  Results for orders placed or performed in visit on 06/28/21   CBC with platelets differential     Status: Abnormal   Result Value Ref Range    WBC 4.6 4.0 - 11.0 10e9/L    RBC Count 4.67 3.8 - 5.2 10e12/L    Hemoglobin 15.6 11.7 - 15.7 g/dL    Hematocrit 44.8 35.0 - 47.0 %    MCV 96 78 - 100 fl    MCH 33.4 (H) 26.5 - 33.0 pg    MCHC 34.8 31.5 - 36.5 g/dL    RDW 12.0 10.0 - 15.0 %    Platelet Count 145 (L) 150 - 450 10e9/L    Diff Method Automated Method     % Neutrophils 69.0 %    % Lymphocytes 19.9 %    % Monocytes 7.9 %    % Eosinophils 1.7 %    % Basophils 0.2 %    % Immature Granulocytes 1.3 %    Nucleated RBCs 0 0 /100    Absolute Neutrophil 3.2 1.6 - 8.3 10e9/L    Absolute Lymphocytes 0.9 0.8 - 5.3 10e9/L    Absolute Monocytes 0.4 0.0 - 1.3 10e9/L    Absolute Eosinophils 0.1 0.0 - 0.7 10e9/L    Absolute Basophils 0.0 0.0 - 0.2 10e9/L    Abs Immature Granulocytes 0.1 0 - 0.4 10e9/L    Absolute Nucleated RBC 0.0    Comprehensive metabolic panel     Status: Abnormal   Result Value Ref Range    Sodium 137 133 - 144 mmol/L    Potassium 3.8 3.4 - 5.3 mmol/L    Chloride 106 94 - 109 mmol/L    Carbon Dioxide 24 20 - 32 mmol/L    Anion Gap 6 3 - 14 mmol/L    Glucose 81 70 - 99 mg/dL    Urea Nitrogen 9 7 - 30 mg/dL    Creatinine 0.80 0.52 - 1.04 mg/dL    GFR Estimate >90 >60 mL/min/[1.73_m2]    GFR Estimate If Black >90 >60 mL/min/[1.73_m2]    Calcium 8.6 8.5 - 10.1 mg/dL    Bilirubin Total 0.3 0.2 - 1.3 mg/dL    Albumin 3.6 3.4 - 5.0 g/dL    Protein Total 7.4 6.8 - 8.8 g/dL    Alkaline Phosphatase 30 (L) 40 - 150 U/L    ALT 46 0 - 50 U/L    AST 38 0 - 45 U/L   Results for orders placed or performed in visit on 06/28/21   HCG Qual, Urine ([id number])     Status: None   Result Value Ref Range    HCG Qual Urine Negative NEG^Negative       EXAMINATION: US PELVIC COMPLETE WITH TRANSVAGINAL, 9/22/2020 3:40 PM      COMPARISON: None.     HISTORY: Pelvic  pain     TECHNIQUE: The pelvis was scanned in standard fashion with  transabdominal and transvaginal transducer(s) using both grey scale  and color Doppler techniques.     FINDINGS  The uterus is anteverted and measures 7.4 x 4.7 x 3.2 cm, and there is  no evidence of a focal fibroid.  The endometrium is within normal  limits and measures 3 mm. There is no free fluid in the pelvis.     The right ovary measures 3.2 x 1.7 x 2.7 cm and the left ovary  measures 2.2 x 3.4 x 1.4 cm. There is no adnexal mass. There is normal  blood flow to the ovaries.                                                                         IMPRESSION: Unremarkable pelvic ultrasound.     I have personally reviewed the examination and initial interpretation  and I agree with the findings.     YOGI LIEBERMAN MD  I reviewed the above with her, labs came back after visit  Dany Coleman MD

## 2021-06-29 ENCOUNTER — TELEPHONE (OUTPATIENT)
Dept: FAMILY MEDICINE | Facility: CLINIC | Age: 31
End: 2021-06-29

## 2021-06-29 ENCOUNTER — MYC MEDICAL ADVICE (OUTPATIENT)
Dept: FAMILY MEDICINE | Facility: CLINIC | Age: 31
End: 2021-06-29

## 2021-06-29 NOTE — TELEPHONE ENCOUNTER
[facility]    Phone Message    May a detailed message be left on voicemail: yes     Reason for Call: Order(s): Other:   Reason for requested: stool sample, pt has orders and will be dropping off a stool sample today, but wanted to add ova and parasite orders for that sample  Date needed: 6/29/21  Provider name: Dr. Coleman      Action Taken: Message routed to:  [facility] ([facility] [place]

## 2021-06-29 NOTE — TELEPHONE ENCOUNTER
[facility] Call [facility]    Phone Message    May a detailed message be left on voicemail: yes     Reason for Call: Other: Pt called in stating she has a lab appt scheduled for today that she did not make.  Pt stated she did the labs yesterday for Dr. Coleman, did she need to do more?      Please call pt to discuss    Action Taken: Message routed to:  [facility] ([facility] P[facility]    Travel Screening: Not Applicable

## 2021-06-30 RX ORDER — PROCHLORPERAZINE 25 MG/1
SUPPOSITORY RECTAL
Qty: 3 EACH | Refills: 11 | Status: SHIPPED | OUTPATIENT
Start: 2021-06-30

## 2021-06-30 RX ORDER — PROCHLORPERAZINE 25 MG/1
SUPPOSITORY RECTAL
Qty: 1 EACH | Refills: 3 | Status: SHIPPED | OUTPATIENT
Start: 2021-06-30

## 2021-06-30 RX ORDER — PROCHLORPERAZINE 25 MG/1
SUPPOSITORY RECTAL
Qty: 1 EACH | Refills: 0 | Status: SHIPPED | OUTPATIENT
Start: 2021-06-30

## 2021-06-30 NOTE — TELEPHONE ENCOUNTER
I called and left message, no other labs needed right now.   Marie Plasencia, EMT at 1:03 PM on 6/30/2021.  [facility]  [facility]  [place]  [phone]

## 2021-07-01 ENCOUNTER — TELEPHONE (OUTPATIENT)
Dept: FAMILY MEDICINE | Facility: CLINIC | Age: 31
End: 2021-07-01

## 2021-07-01 DIAGNOSIS — Z30.014 ENCOUNTER FOR INITIAL PRESCRIPTION OF INTRAUTERINE CONTRACEPTIVE DEVICE (IUD): Primary | ICD-10-CM

## 2021-07-01 NOTE — TELEPHONE ENCOUNTER
Kyleena IUD insertion.  Appointment: 07/26 at 3:30 PM  Provider: Dany Coleman MD    Patient is currently on norethindrone birth control.  She will continue to take this.  Urine pregnancy test 2 weeks prior and 30 minutes prior to IUD placement.     Kyleena IUD order (future) pended for provider to sign.  Will initiate PA once Kyleena order is signed.        Constantino Vital CMA ([facility]) at 1:34 PM on 7/1/2021

## 2021-07-02 NOTE — TELEPHONE ENCOUNTER
Diagnoses and all orders for this visit:    Encounter for initial prescription of intrauterine contraceptive device (IUD)  -     levonorgestrel (KYLEENA) 19.5 MG IUD 19.5 mg  -     HCG Qual, Urine ([id number]); Future    Dany Coleman MD

## 2021-07-05 ENCOUNTER — MYC MEDICAL ADVICE (OUTPATIENT)
Dept: FAMILY MEDICINE | Facility: CLINIC | Age: 31
End: 2021-07-05

## 2021-07-07 NOTE — TELEPHONE ENCOUNTER
Called and left patient VM.  Reminder to get a urine HCG test 30 minutes prior to IUD insertion.  Get the urine in lab at 3:00 PM on 07/26/21.        Constantino Vital CMA ([facility]) at 8:11 AM on 7/7/2021

## 2021-07-07 NOTE — TELEPHONE ENCOUNTER
PA checked.  Patient good to go!       Constantino Vital CMA ([facility]) at 8:00 AM on 7/7/2021

## 2021-07-19 ENCOUNTER — OFFICE VISIT (OUTPATIENT)
Dept: INTERNAL MEDICINE | Facility: CLINIC | Age: 31
End: 2021-07-19
Payer: COMMERCIAL

## 2021-07-19 ENCOUNTER — LAB (OUTPATIENT)
Dept: LAB | Facility: CLINIC | Age: 31
End: 2021-07-19

## 2021-07-19 VITALS
DIASTOLIC BLOOD PRESSURE: 92 MMHG | BODY MASS INDEX: 25.71 KG/M2 | OXYGEN SATURATION: 95 % | SYSTOLIC BLOOD PRESSURE: 133 MMHG | WEIGHT: 146 LBS | HEART RATE: 88 BPM

## 2021-07-19 DIAGNOSIS — Z13.220 SCREENING FOR HYPERLIPIDEMIA: ICD-10-CM

## 2021-07-19 DIAGNOSIS — Z83.3 FAMILY HISTORY OF DIABETES MELLITUS: ICD-10-CM

## 2021-07-19 DIAGNOSIS — Z30.014 ENCOUNTER FOR INITIAL PRESCRIPTION OF INTRAUTERINE CONTRACEPTIVE DEVICE (IUD): ICD-10-CM

## 2021-07-19 DIAGNOSIS — R19.5 CHANGE IN STOOL: ICD-10-CM

## 2021-07-19 DIAGNOSIS — Z13.220 SCREENING FOR HYPERLIPIDEMIA: Primary | ICD-10-CM

## 2021-07-19 LAB
CHOLEST SERPL-MCNC: 186 MG/DL
FASTING STATUS PATIENT QL REPORTED: YES
HBA1C MFR BLD: 5 % (ref 0–5.6)
HDLC SERPL-MCNC: 68 MG/DL
LDLC SERPL CALC-MCNC: 100 MG/DL
NONHDLC SERPL-MCNC: 118 MG/DL
TRIGL SERPL-MCNC: 92 MG/DL

## 2021-07-19 PROCEDURE — 99213 OFFICE O/P EST LOW 20 MIN: CPT | Performed by: INTERNAL MEDICINE

## 2021-07-19 PROCEDURE — 80061 LIPID PANEL: CPT | Performed by: PATHOLOGY

## 2021-07-19 PROCEDURE — 83036 HEMOGLOBIN GLYCOSYLATED A1C: CPT | Performed by: PATHOLOGY

## 2021-07-19 PROCEDURE — 36415 COLL VENOUS BLD VENIPUNCTURE: CPT | Performed by: PATHOLOGY

## 2021-07-19 ASSESSMENT — PAIN SCALES - GENERAL: PAINLEVEL: NO PAIN (0)

## 2021-07-19 NOTE — PROGRESS NOTES
History of Present Illness:  Ms. Sears is a 30 year old female who presents for  Chief Complaint   Patient presents with     GI Problem     pt here to discuss contiues stomach issues, stool        A couple weeks ago had severe abd pain, followed by dull pain.  Did labs and H pylori testing which were normal.  Pain is now resolved.  Has noted black flecks in stool, as well as long white fibrous particles in stools.  Normal formed stools.  No constipation/diarrhea.  No red/blood.  No special diet, other than gluten free.  No changes in weight recently.  No fevers, no vomiting, no nausea.  Traveled to [place] in 12/19.  Pap before moved in 2019.      Review of external notes as documented above                   A detailed Review of Systems was performed, verified and is negative except as documented in the HPI.  All health questionnaires were reviewed, verified and relevant information documented above.    Past Medical History:  Past Medical History:   Diagnosis Date     Dysmenorrhea      Frequent PVCs (H)      Migraine without aura and without status migrainosus, not intractable        Past Surgical History:  Past Surgical History:   Procedure Laterality Date     Cardiac ablation (2013)         Active Meds:  Current Outpatient Medications   Medication     Continuous Blood Gluc  (DEXCOM G6 ) BRITNEY     Continuous Blood Gluc Sensor (DEXCOM G6 SENSOR) MISC     Continuous Blood Gluc Transmit (DEXCOM G6 TRANSMITTER) MISC     famotidine (PEPCID) 40 MG tablet     meclizine (ANTIVERT) 12.5 MG tablet     naproxen (NAPROSYN) 500 MG tablet     norethindrone (MICRONOR) 0.35 MG tablet     Current Facility-Administered Medications   Medication     [START ON 7/26/2021] levonorgestrel (KYLEENA) 19.5 MG IUD 19.5 mg        Allergies:  Gluten meal    Family History:  family history includes Cerebrovascular Disease in her maternal grandfather and maternal grandmother; No Known Problems in her father and mother.    Social  History:  Social History     Tobacco Use     Smoking status: Never Smoker     Smokeless tobacco: Never Used   Substance Use Topics     Alcohol use: Yes     Alcohol/week: 14.0 standard drinks     Types: 14 Glasses of wine per week     Drug use: Never       Physical Exam:  Vitals: BP (!) 133/92 (BP Location: Right arm, Patient Position: Sitting, Cuff Size: Adult Regular)   Pulse 88   Wt 66.2 kg (146 lb)   SpO2 95%   BMI 25.71 kg/m    Constitutional: Alert, oriented, pleasant, no acute distress  Head: Normocephalic, atraumatic  Eyes: Extra-ocular movements intact, pupils equally round and reactive bilaterally, no scleral icterus  Neck: Supple, no lymphadenopathy  Cardiovascular: Regular rate and rhythm, no murmurs, rubs or gallops, peripheral pulses full/symmetric  Respiratory: Good air movement bilaterally, lungs clear, no wheezes/rales/rhonchi  GI: Abdomen soft, bowel sounds present, nondistended, nontender, no organomegaly or masses, no rebound/guarding  Musculoskeletal: No edema, normal muscle tone, normal gait  Neurologic: Alert and oriented, cranial nerves 2-12 intact, grossly non-focal  Skin: No rashes/lesions  Psychiatric: normal mentation, affect and mood      Diagnostics:  Labs reviewed in Epic          Assessment and Plan:  Leonor was seen today for gi problem.    Diagnoses and all orders for this visit:    Screening for hyperlipidemia  -     Lipid Profile FASTING; Future    Family history of diabetes mellitus  -     Hemoglobin A1c; Future    Change in stool  Non-specific, no overt signs of bleeding, colitis or malabsorption. No risks for infection.  May also be on IBS spectrum. Advised monitoring and to update us for changes. Reassuring that recent labs were normal.          Digna Serrano MD  Internal Medicine      >20 minutes spent today performing chart review, history and exam, documentation and further activities as noted above.

## 2021-07-19 NOTE — NURSING NOTE
Chief Complaint   Patient presents with     GI Problem     pt here to discuss contiues stomach issues, stool        Jennifer Pfeiffer CMA, EMT at 7:48 AM on 7/19/2021.

## 2021-07-20 DIAGNOSIS — K21.00 GASTROESOPHAGEAL REFLUX DISEASE WITH ESOPHAGITIS WITHOUT HEMORRHAGE: ICD-10-CM

## 2021-07-22 NOTE — TELEPHONE ENCOUNTER
FAMOTIDINE 40 MG TABLET  Last Written Prescription Date:  6/28/2021  Last Fill Quantity: 30,   # refills: 0  Last Office Visit : 7/19/2021  Future Office visit:  7/26/2021    Routing refill request to provider for review/approval because:  Would Provider like to add refills to current order due to continuous request for medication.  Please advise       Myah Carey RN  Central Triage Red Flags/Med Refills

## 2021-07-23 RX ORDER — FAMOTIDINE 40 MG/1
40 TABLET, FILM COATED ORAL 2 TIMES DAILY PRN
Qty: 30 TABLET | Refills: 0 | Status: SHIPPED | OUTPATIENT
Start: 2021-07-23 | End: 2021-08-09

## 2021-07-26 ENCOUNTER — LAB (OUTPATIENT)
Dept: LAB | Facility: CLINIC | Age: 31
End: 2021-07-26

## 2021-07-26 ENCOUNTER — LAB (OUTPATIENT)
Dept: LAB | Facility: CLINIC | Age: 31
End: 2021-07-26
Payer: COMMERCIAL

## 2021-07-26 ENCOUNTER — OFFICE VISIT (OUTPATIENT)
Dept: FAMILY MEDICINE | Facility: CLINIC | Age: 31
End: 2021-07-26
Payer: COMMERCIAL

## 2021-07-26 VITALS
OXYGEN SATURATION: 99 % | WEIGHT: 145 LBS | DIASTOLIC BLOOD PRESSURE: 84 MMHG | SYSTOLIC BLOOD PRESSURE: 131 MMHG | BODY MASS INDEX: 26.68 KG/M2 | HEART RATE: 60 BPM | HEIGHT: 62 IN

## 2021-07-26 DIAGNOSIS — Z30.09 ENCOUNTER FOR OTHER GENERAL COUNSELING OR ADVICE ON CONTRACEPTION: ICD-10-CM

## 2021-07-26 DIAGNOSIS — Z23 ENCOUNTER FOR IMMUNIZATION: ICD-10-CM

## 2021-07-26 DIAGNOSIS — Z11.4 SCREENING FOR HIV (HUMAN IMMUNODEFICIENCY VIRUS): ICD-10-CM

## 2021-07-26 DIAGNOSIS — Z11.59 ENCOUNTER FOR HEPATITIS C SCREENING TEST FOR LOW RISK PATIENT: ICD-10-CM

## 2021-07-26 DIAGNOSIS — Z30.430 ENCOUNTER FOR IUD INSERTION: Primary | ICD-10-CM

## 2021-07-26 LAB — HCG UR QL: NEGATIVE

## 2021-07-26 PROCEDURE — 86803 HEPATITIS C AB TEST: CPT | Mod: 90 | Performed by: PATHOLOGY

## 2021-07-26 PROCEDURE — 81025 URINE PREGNANCY TEST: CPT | Performed by: PATHOLOGY

## 2021-07-26 PROCEDURE — 87389 HIV-1 AG W/HIV-1&-2 AB AG IA: CPT | Mod: 90 | Performed by: PATHOLOGY

## 2021-07-26 PROCEDURE — 99213 OFFICE O/P EST LOW 20 MIN: CPT | Mod: 25 | Performed by: FAMILY MEDICINE

## 2021-07-26 PROCEDURE — 36415 COLL VENOUS BLD VENIPUNCTURE: CPT | Performed by: PATHOLOGY

## 2021-07-26 PROCEDURE — 58300 INSERT INTRAUTERINE DEVICE: CPT | Performed by: FAMILY MEDICINE

## 2021-07-26 ASSESSMENT — PAIN SCALES - GENERAL: PAINLEVEL: NO PAIN (0)

## 2021-07-26 ASSESSMENT — MIFFLIN-ST. JEOR: SCORE: 1335.97

## 2021-07-26 NOTE — PATIENT INSTRUCTIONS
Preventive Health Recommendations  Female Ages 26 - 39  Yearly exam:   See your health care provider every year in order to    Review health changes.     Discuss preventive care.      Review your medicines if you your doctor has prescribed any.    Until age 30: Get a Pap test every three years (more often if you have had an abnormal result).    After age 30: Talk to your doctor about whether you should have a Pap test every 3 years or have a Pap test with HPV screening every 5 years.   You do not need a Pap test if your uterus was removed (hysterectomy) and you have not had cancer.  You should be tested each year for STDs (sexually transmitted diseases), if you're at risk.   Talk to your provider about how often to have your cholesterol checked.  If you are at risk for diabetes, you should have a diabetes test (fasting glucose).  Shots: Get a flu shot each year. Get a tetanus shot every 10 years.   Nutrition:     Eat at least 5 servings of fruits and vegetables each day.    Eat whole-grain bread, whole-wheat pasta and brown rice instead of white grains and rice.    Get adequate Calcium and Vitamin D.     Lifestyle    Exercise at least 150 minutes a week (30 minutes a day, 5 days of the week). This will help you control your weight and prevent disease.    Limit alcohol to one drink per day.    No smoking.     Wear sunscreen to prevent skin cancer.    See your dentist every six months for an exam and cleaning.  IUD AFTERCARE INSTRUCTIONS     Nothing in the vagina for 7 days (no intercourse or tampons)  May take 600 mg ibuprofen every 6 hrs as needed for pain or Naprosyn 500 mg every 8-12 hours    1. Uterine cramping is common after IUD placement. You can help relieve the discomfort with heating pads, Tylenol (acetaminophen), Aspirin or Advil (ibuprofen). If your cramping becomes very painful or cramping persist after 1 week, please call the clinic at [phone].     2. Irregular bleeding and spotting is normal for  the first few months after the IUD is placed. In some cases, women may experience irregular bleeding or spotting for up to six months after the IUD is placed. This bleeding can be annoying at first but usually will become lighter with the Kyleena IUD quickly. Call the clinic if your bleeding is excessive and not getting better.     3. Your period will likely be shorter and lighter with a IUD. Approximately 40% of women will stop having periods altogether with the Mirena/ kyleena IUD. Your period may be heavier and longer with the Paragard IUD.     4. IUDs do not protect against sexually transmitted infections including the AIDS virus (HIV), warts (HPV), gonorrhea, Chlamydia, and herpes. Condoms should be used to decrease the risk sexually transmitted infections. If you think that you have been exposed to a sexually transmitted infection, please call the clinic.     5. If you had the IUD placed for birth control, the Paragard IUD is effective immediately. The Mirena/ kyleena IUD is effective immediately if it was inserted within seven days after the start of your period. If you have Kyleena inserted at any other time during your menstrual cycle, use another method of birth control, like condoms for at least 7 days.     6. It is possible for the IUD to come out of the uterus. If it does slip out of place, it is most likely to happen in the first few months after being put in. To make sure your IUD is in place, you can feel for the IUD strings between periods. To check for strings, wash your hands. Then, sit or squat down. Place one finger into your vagina until you feel your cervix. It will feel hard and rubbery, like the end of your nose. The string ends should be coming through your cervix. Do not pull on the strings. If the strings feel much longer than before, if you feel the hard plastic part of the IUD, or if you cannot feel the strings at all, the IUD may have moved out of place. Please call the clinic and  consider using a back up form of birth control until you are seen.     7. Keep your follow-up appointment for 4-6 weeks after the IUD has been placed if not able to feel the strings    8. Pregnancy is unlikely after IUD placement, but can happen. If you have early pregnancy symptoms like nausea and vomiting, breast tenderness, frequent urination or abdominal pain, you can take a pregnancy test. Please call the clinic if you have any concerns or if your pregnancy test is positive.     9. The IUD should only be removed by a healthcare provider.     The Mirena or Kyleena IUD should be removed and/or replaced after 5 years.     The Paragard IUD should be removed and/or replaced after 10 years.     Warning Signs   Call the clinic if any of the following occurs:       Severe abdominal pain or cramping       Unusual bleeding       Fever or chills       Foul smelling vaginal discharge       Painful intercourse       Positive pregnancy test.

## 2021-07-26 NOTE — PROGRESS NOTES
SUBJECTIVE:   CC: Leonor Sears is an 30 year old woman who presents for preventive health review/ IUD insertion.   She was taking combined oral contraceptives however has a history of migraines and had elevated blood pressure therefore was counseled to discontinue combined oral contraceptives switch to progesterone only birth control pill until to have Kyleena IUD placed.  Her blood pressure has improved off of the oral contraceptives and migraines have also resolved without a migraine for several weeks.  Reviewed her chart she updated me on last pap through johanna on her phone does not require PAP.  We reviewed TDAP, she agreed to vaccine however prior to the end of the appointment she found documentation of vaccine.  She does not require physical exam today recently had a visit with Dr. Digna nieves.  Social History     Social History Narrative    Post Doc, neuroimmunology working on pre-clinical Stroke                Reviewed orders with patient.  Reviewed health maintenance and updated orders accordingly - Yes  Labs reviewed in EPIC  BP Readings from Last 3 Encounters:   07/26/21 131/84   07/19/21 (!) 133/92   06/28/21 129/82    Wt Readings from Last 3 Encounters:   07/26/21 65.8 kg (145 lb)   07/19/21 66.2 kg (146 lb)   06/28/21 65.9 kg (145 lb 4.8 oz)               Immunization History   Administered Date(s) Administered     COVID-19,PF,Pfizer 01/14/2021, 02/02/2021     Influenza Vaccine IM > 6 months Valent IIV4 09/21/2020     Tdap (Adult) Unspecified Formulation 02/18/2017        Patient Active Problem List   Diagnosis     TMJ (temporomandibular joint syndrome)     Migraine without aura and without status migrainosus, not intractable     Family history of diabetes mellitus     Gastroesophageal reflux disease with esophagitis without hemorrhage     Elevated BP without diagnosis of hypertension     Past Surgical History:   Procedure Laterality Date     Cardiac ablation (2013)         Social History     Tobacco  "Use     Smoking status: Never Smoker     Smokeless tobacco: Never Used   Substance Use Topics     Alcohol use: Yes     Alcohol/week: 14.0 standard drinks     Types: 14 Glasses of wine per week     Family History   Problem Relation Age of Onset     Diabetes Father      Migraines Father      No Known Problems Mother      Cerebrovascular Disease Maternal Grandmother      Cerebrovascular Disease Maternal Grandfather          Current Outpatient Medications   Medication Sig Dispense Refill     Continuous Blood Gluc  (DEXCOM G6 ) BRITNEY Use to read blood sugars as per 's instructions. 1 each 0     Continuous Blood Gluc Sensor (DEXCOM G6 SENSOR) MISC Change every 10 days. 3 each 11     Continuous Blood Gluc Transmit (DEXCOM G6 TRANSMITTER) MISC Change every 3 months. 1 each 3     famotidine (PEPCID) 40 MG tablet Take 1 tablet (40 mg) by mouth 2 times daily as needed for heartburn 30 tablet 0     meclizine (ANTIVERT) 12.5 MG tablet        naproxen (NAPROSYN) 500 MG tablet        norethindrone (MICRONOR) 0.35 MG tablet Take 1 tablet (0.35 mg) by mouth daily 90 tablet 3     Allergies   Allergen Reactions     Gluten Meal Cramps, Diarrhea, GI Disturbance, Hives, Itching and Rash     Recent Labs   Lab Test 07/19/21  1238 06/28/21  1222   A1C 5.0  --      --    HDL 68  --    TRIG 92  --    ALT  --  46   CR  --  0.80   GFRESTIMATED  --  >90   GFRESTBLACK  --  >90   POTASSIUM  --  3.8        Pertinent mammograms are reviewed under the imaging tab. n a  History of abnormal Pap smear: NO - age 21-29 PAP every 3 years recommended Last PAP 11/14/ 2019 next due 3 years 11/14/2022       Reviewed and updated as needed this visit by Provider  Tobacco  Allergies  Meds  Problems  Med Hx  Surg Hx  Fam Hx               OBJECTIVE:   /84   Pulse 60   Ht 1.575 m (5' 2\")   Wt 65.8 kg (145 lb)   LMP 05/24/2021   SpO2 99%   BMI 26.52 kg/m    EXAM:  GENERAL: healthy, alert and no distress  EYES: Eyes " "grossly normal to inspection, PERRL and conjunctivae and sclerae normal   (female): normal female external genitalia, normal urethral meatus, vaginal mucosa pink, moist, well rugated, and normal cervix/adnexa/uterus without masses or discharge  MS: no gross musculoskeletal defects noted, no edema  PSYCH: mentation appears normal, affect normal/bright      ASSESSMENT/PLAN:   1. Encounter for IUD insertion  She presents today for IUD insertion Richy please see procedure note    2. Encounter for immunization  Today it appeared she needed a tetanus update she agreed to it however at the end of the visit she was able to review her previous records noting tetanus shot in 2017    3. Encounter for hepatitis C screening test for low risk patient  Reviewed pending screening recommendations.  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 Screening for HIV (human immunodeficiency virus)  Reviewed pending screening recommendations.  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  - HIV Antigen Antibody Combo; Future  COUNSELING:   Reviewed preventive health counseling, as reflected in patient instructions       Immunizations    Declined: TDAP due to Other found record last       Contraception       Consider Hep C screening for all patients one time for ages 18-79 years ordered       HIV screeninx in teen years, 1x in adult years, and at intervals if high risk ordered   on review of her records Normal PAP , order for PAP cancelled Next PAP due 2022  Estimated body mass index is 26.52 kg/m  as calculated from the following:    Height as of this encounter: 1.575 m (5' 2\").    Weight as of this encounter: 65.8 kg (145 lb).        She reports that she has never smoked. She has never used smokeless tobacco.      Counseling Resources:  ATP IV Guidelines  Pooled Cohorts Equation " Calculator  Breast Cancer Risk Calculator  BRCA-Related Cancer Risk Assessment: FHS-7 Tool  FRAX Risk Assessment  ICSI Preventive Guidelines  Dietary Guidelines for Americans, 2010  USDA's MyPlate  ASA Prophylaxis  Lung CA Screening    Dany Coleman MD  [facility]

## 2021-07-26 NOTE — PROGRESS NOTES
[facility]  IUD Insertion Note    Leonor Sears is a patient of Whitney Gibbons here for an IUD insertion   Indication:Desires contraception    Counseling: Discussed potential side effects of Kyleena  including unpredictable spotting and amenorrhea.  Patient aware to check for strings every month.    Consent: Affirmation of informed consent was signed and scanned into the medical record. Risks, benefits and alternatives were discussed including risk of infection, bleeding and small risk of uterine perforation.  Patient's questions were elicited and answered.   Procedure safety checklist was completed:  Yes  Time Out (Pause for the Cause) completed: Yes    Labs: UPT negative    Technique:   Patient was premedicated with Naprosyn 500 mg:   Yes  Uterine position was confirmed by bimanual exam: Yes   Using a sterile speculum and equipment, the cervix was examined.     The cervix was cleansed with Betadine prep. A tenaculum was applied to the cervix with tension to straighten the cervical canal.  The uterus was sounded to 7 cm.  A Kyleena IUD was inserted in the usual fashion and strings trimmed 2 cm below the cervix.  EBL: minimal  IUD Kyleena [id number]  Complications: No  Tolerance: Pt tolerated procedure well and was in stable condition.  She was observed in the clinic for 15 min with post-procedure /84    Follow up: Pt was instructed to call if fever, chills, heavy bleeding, severe cramping or foul smelling discharge. May take 600 mg ibuprofen QID prn for mild cramping.  She was advised to check the IUD strings monthly.  Recommended that she return in 1 month for IUD string check.    Dany Coleman MD

## 2021-07-26 NOTE — NURSING NOTE
Chief Complaint   Patient presents with     Women's Health     iud insertion, pap, got lab work(urine) at 3 pm        Darby Jimenez, EMT at 3:26 PM on 7/26/2021

## 2021-07-26 NOTE — Clinical Note
Please capture Kyleena IUD insert, addressed several preventive issues but did not do a full preventive visit as not needed.  Review coding and provide feedback,  Thank you,  Dany Coleman MD

## 2021-07-27 LAB
HCV AB SERPL QL IA: NONREACTIVE
HIV 1+2 AB+HIV1 P24 AG SERPL QL IA: NONREACTIVE

## 2021-07-27 NOTE — RESULT ENCOUNTER NOTE
Dear Leonor Sears   Pregnancy test was negative as discussed at visit.  Best wishes,  Dany Coleman MD

## 2021-08-06 DIAGNOSIS — K21.00 GASTROESOPHAGEAL REFLUX DISEASE WITH ESOPHAGITIS WITHOUT HEMORRHAGE: ICD-10-CM

## 2021-08-09 RX ORDER — FAMOTIDINE 40 MG/1
40 TABLET, FILM COATED ORAL 2 TIMES DAILY PRN
Qty: 60 TABLET | Refills: 11 | Status: SHIPPED | OUTPATIENT
Start: 2021-08-09 | End: 2021-08-27

## 2021-08-23 DIAGNOSIS — K21.00 GASTROESOPHAGEAL REFLUX DISEASE WITH ESOPHAGITIS WITHOUT HEMORRHAGE: ICD-10-CM

## 2021-08-27 RX ORDER — FAMOTIDINE 40 MG/1
40 TABLET, FILM COATED ORAL 2 TIMES DAILY PRN
Qty: 180 TABLET | Refills: 4 | Status: SHIPPED | OUTPATIENT
Start: 2021-08-27

## 2021-08-27 NOTE — TELEPHONE ENCOUNTER
7/26/2021 Last visit [facility] [place]( patient requests 90 day refill rather than 30 day)

## 2021-08-30 ENCOUNTER — OFFICE VISIT (OUTPATIENT)
Dept: FAMILY MEDICINE | Facility: CLINIC | Age: 31
End: 2021-08-30
Payer: COMMERCIAL

## 2021-08-30 ENCOUNTER — ANCILLARY PROCEDURE (OUTPATIENT)
Dept: ULTRASOUND IMAGING | Facility: CLINIC | Age: 31
End: 2021-08-30
Attending: FAMILY MEDICINE
Payer: COMMERCIAL

## 2021-08-30 VITALS
WEIGHT: 148.5 LBS | HEIGHT: 62 IN | OXYGEN SATURATION: 96 % | BODY MASS INDEX: 27.33 KG/M2 | RESPIRATION RATE: 16 BRPM | SYSTOLIC BLOOD PRESSURE: 116 MMHG | HEART RATE: 81 BPM | DIASTOLIC BLOOD PRESSURE: 75 MMHG | TEMPERATURE: 98.3 F

## 2021-08-30 DIAGNOSIS — D23.9 MULTIPLE DYSPLASTIC NEVI: ICD-10-CM

## 2021-08-30 DIAGNOSIS — L50.9 URTICARIA: Primary | ICD-10-CM

## 2021-08-30 DIAGNOSIS — Z30.431 IUD CHECK UP: ICD-10-CM

## 2021-08-30 PROCEDURE — 76856 US EXAM PELVIC COMPLETE: CPT | Mod: GC | Performed by: RADIOLOGY

## 2021-08-30 PROCEDURE — 76830 TRANSVAGINAL US NON-OB: CPT | Mod: GC | Performed by: RADIOLOGY

## 2021-08-30 PROCEDURE — 99214 OFFICE O/P EST MOD 30 MIN: CPT | Performed by: FAMILY MEDICINE

## 2021-08-30 RX ORDER — PREDNISONE 20 MG/1
40 TABLET ORAL DAILY
Qty: 10 TABLET | Refills: 0 | Status: SHIPPED | OUTPATIENT
Start: 2021-08-30 | End: 2021-09-04

## 2021-08-30 ASSESSMENT — MIFFLIN-ST. JEOR: SCORE: 1351.84

## 2021-08-30 ASSESSMENT — PAIN SCALES - GENERAL: PAINLEVEL: NO PAIN (0)

## 2021-08-30 NOTE — PROGRESS NOTES
Assessment & Plan   Leeann presents today for several concerns.   Urticaria   She has a mosquito bite to the right hip with area of urticaria and swelling not responding to Claritin 10 mg daily.  She has an allergy to mosquito bites with urticaria in the past therefore will start prednisone 20 mg 2 tablets for 40 mg dose once daily for 4 to 5 days.  She should increase Claritin to 10 mg twice daily.  She did not want an EpiPen and her current symptoms do not require EpiPen.    - predniSONE (DELTASONE) 20 MG tablet  Dispense: 10 tablet; Refill: 0    Multiple dysplastic nevi  She has several areas of irregular nevi neck, right ear, left leg consistent with dysplastic nevi therefore referral to dermatology for further evaluation.  Reviewed Abcde nevi  - Adult Dermatology Referral    IUD check up  On string check after Kyleena placement it appears strings have markedly shortened from time of placement they were trimmed to 2 to 3 cm.  Will order pelvic ultrasound to ensure IUD is in good position.  Discussed sometimes length of the cervix changes during  the menstrual cycle and uncertain if her urticaria may be contributing to edema in other portions of her body.    - US Pelvic w/ Transvaginal  35 minutes spent on the date of the encounter doing chart review, history, exam, diagnostics review, documentation, counseling and coordination of cares as noted.                     Dany Coleman MD  [facility]   Leeann is a 30 year old who presents for the following health issues     HPI   Leeann is a 30-year-old with history of gastroesophageal reflux remotely, history of dysmenorrhea improved on Seasonique however due to intermittent migraine headache and increased blood pressure recommended discontinuation of combined oral contraceptives with placement of Kyleena 7/26/2021. She presents today for string check, had difficulty finding strings.  She has not noted  passing IUD had some cramping after insertion.  Skin concerns  Mole on left leg seems like it is increasing in size and  Mole neck present for many years has noted changing. She has two dark nevi on right ear difficult to see and she is wondering if she needs dermatology evaluation.  Urticaria  She has noted a rash on her neck after mosquito bite on right hip, right arm.  Mosquito bite has known allergy to bites right noted a mosquito was on her clothing and bit through the clothing to the right hip hip while she was wearing long pants and uses bug spray normally but the insect bite through her pants. She had to have steroids  In the past for reaction to mosquito bites.  She has tried multiple ice packs and Claritin 10 mg 1 daily, tried topical benadryl gel.  She also has small mosquito bites to right shoulder and left hand.  She has noted a rash on her neck flared with the bite.  She denies shortness of breath, throat swelling and never has been prescribed an epi pen.          Labs reviewed in EPIC  BP Readings from Last 3 Encounters:   08/30/21 116/75   07/26/21 131/84   07/19/21 (!) 133/92    Wt Readings from Last 3 Encounters:   08/30/21 67.4 kg (148 lb 8 oz)   07/26/21 65.8 kg (145 lb)   07/19/21 66.2 kg (146 lb)         Immunization History   Administered Date(s) Administered     COVID-19,PF,Pfizer 01/14/2021, 02/02/2021     DTAP (<7y) 04/01/1996, 06/14/1996     DTAP-IPV, <7Y 04/21/1992     DTAP-IPV/HIB (PENTACEL) 1991     Hep B, Peds or Adolescent 1991, 1991, 04/21/1992     HepB, Unspecified 07/31/2014, 09/14/2014, 01/12/2015     Hib (PRP-T) 11/19/1992, 06/14/1993     Hpv, Unspecified  06/19/2012, 10/30/2012, 08/30/2013     Influenza Vaccine IM > 6 months Valent IIV4 10/31/2016, 10/25/2017, 11/14/2018, 10/18/2019, 09/21/2020     MMR 11/19/1992, 04/01/1996, 06/03/2015     Polio, Unspecified  06/14/1993, 04/01/1996     TD (ADULT, 7+) 09/30/2004     TDAP Vaccine (Adacel) 02/18/2017     Tdap  (Adult) Unspecified Formulation 02/18/2017              Patient Active Problem List   Diagnosis     TMJ (temporomandibular joint syndrome)     Migraine without aura and without status migrainosus, not intractable     Family history of diabetes mellitus     Gastroesophageal reflux disease with esophagitis without hemorrhage     Elevated BP without diagnosis of hypertension     Encounter for IUD insertion     Multiple dysplastic nevi     Urticaria     Past Surgical History:   Procedure Laterality Date     C KYLEENA IUD 19.5 MG  07/26/2021    IUD Kyleena [id number]  7/26/2021 removal to be done 7/26/2026     Cardiac ablation (2013)         Social History     Tobacco Use     Smoking status: Never Smoker     Smokeless tobacco: Never Used   Substance Use Topics     Alcohol use: Yes     Alcohol/week: 14.0 standard drinks     Types: 14 Glasses of wine per week     Family History   Problem Relation Age of Onset     Diabetes Father      Migraines Father      No Known Problems Mother      Cerebrovascular Disease Maternal Grandmother      Cerebrovascular Disease Maternal Grandfather          Current Outpatient Medications   Medication Sig Dispense Refill     Continuous Blood Gluc  (DEXCOM G6 ) BRITNEY Use to read blood sugars as per 's instructions. 1 each 0     Continuous Blood Gluc Sensor (DEXCOM G6 SENSOR) MISC Change every 10 days. 3 each 11     Continuous Blood Gluc Transmit (DEXCOM G6 TRANSMITTER) MISC Change every 3 months. 1 each 3     famotidine (PEPCID) 40 MG tablet TAKE 1 TABLET (40 MG) BY MOUTH 2 TIMES DAILY AS NEEDED FOR HEARTBURN 180 tablet 4     meclizine (ANTIVERT) 12.5 MG tablet        naproxen (NAPROSYN) 500 MG tablet        norethindrone (MICRONOR) 0.35 MG tablet Take 1 tablet (0.35 mg) by mouth daily 90 tablet 3     Allergies   Allergen Reactions     Mosquitoes (Informational Only) Hives     Gluten Meal Cramps, Diarrhea, GI Disturbance, Hives, Itching and Rash     Recent Labs   Lab Test  "07/19/21  1238 06/28/21  1222   A1C 5.0  --      --    HDL 68  --    TRIG 92  --    ALT  --  46   CR  --  0.80   GFRESTIMATED  --  >90   GFRESTBLACK  --  >90   POTASSIUM  --  3.8      Review of Systems         Objective    /75 (BP Location: Right arm, Patient Position: Sitting, Cuff Size: Adult Regular)   Pulse 81   Temp 98.3  F (36.8  C) (Oral)   Resp 16   Ht 1.575 m (5' 2\")   Wt 67.4 kg (148 lb 8 oz)   SpO2 96%   BMI 27.16 kg/m    Body mass index is 27.16 kg/m .  Physical Exam   General healthy female no acute distress  Lungs clear  Skin irregular raised black nevi anterior neck with slightly erythematous base and variation of color.  Right ear pinna slightly raised dome-shaped nevi slight blue hue, black raised slightly irregular nevi.  Left anterior lower leg 1 mm dark flat nevi with irregular coloration throughout, left posterior leg brownish-black nevi 2 mm in diameter.  Right hip posteriorly erythematous hive approximately 2 cm in diameter with surrounding swelling, erythematous macular urticarial rash neck, right shoulder healing mosquito bite with slight swelling no erythema.   on inspection vaginal mucosa normal cervix moderate cervical mucus removed string visible in cervical os but much shorter than original length of 2 cm.                  "

## 2021-08-30 NOTE — PATIENT INSTRUCTIONS
Patient Education     Checking for Skin Cancer  You can find cancer early by checking your skin each month. There are 3 kinds of skin cancer. They are melanoma, basal cell carcinoma, and squamous cell carcinoma. Doing monthly skin checks is the best way to find new marks or skin changes. Follow the instructions below for checking your skin.  The ABCDEs of checking moles for melanoma  Check your moles or growths for signs of melanoma using ABCDE:    Asymmetry: the sides of the mole or growth don t match    Border: the edges are ragged, notched, or blurred    Color: the color within the mole or growth varies    Diameter: the mole or growth is larger than 6 mm (size of a pencil eraser)    Evolving: the size, shape, or color of the mole or growth is changing    Checking for other types of skin cancer  Basal cell carcinoma or squamous cell carcinoma have symptoms such as:    A spot or mole that looks different from all other marks on your skin    Changes in how an area feels, such as itching, tenderness, or pain    Changes in the skin's surface, such as oozing, bleeding, or scaliness    A sore that does not heal    New swelling or redness beyond the border of a mole  Who s at risk?  Anyone can get skin cancer. But you are at greater risk if you have:    Fair skin, light-colored hair, or light-colored eyes    Many moles or abnormal moles on your skin    A history of sunburns from sunlight or tanning beds    A family history of skin cancer    A history of exposure to radiation or chemicals    A weakened immune system  If you have had skin cancer in the past, you are at risk for recurring skin cancer.  How to check your skin  Do your monthly skin checkups in front of a full-length mirror. Check all parts of your body, including your:    Head (ears, face, neck, and scalp)    Torso (front, back, and sides)    Arms (tops, undersides, upper, and lower armpits)    Hands (palms, backs, and fingers, including under the  nails)    Buttocks and genitals    Legs (front, back, and sides)    Feet (tops, soles, toes, including under the nails, and between toes)  If you have a lot of moles, take digital photos of them each month. Make sure to take photos both up close and from a distance. These can help you see if any moles change over time.  Most skin changes are not cancer. But if you see any changes in your skin, call your doctor right away. Only he or she can diagnose a problem. If you have skin cancer, seeing your doctor can be the first step toward getting the treatment that could save your life.  [employer] last reviewed this educational content on 4/1/2019 2000-2021 [employer]. All rights reserved. This information is not intended as a substitute for professional medical care. Always follow your healthcare professional's instructions.

## 2021-08-30 NOTE — NURSING NOTE
Chief Complaint   Patient presents with     Derm Problem     Patient comes in for a skin check of moles on her body.     IUD     Patient would like to discuss recent IUD placement.         Brent Martinez MA on 8/30/2021 at 3:24 PM

## 2021-08-31 ENCOUNTER — MYC MEDICAL ADVICE (OUTPATIENT)
Dept: FAMILY MEDICINE | Facility: CLINIC | Age: 31
End: 2021-08-31

## 2021-08-31 DIAGNOSIS — N83.201 HEMORRHAGIC CYST OF RIGHT OVARY: ICD-10-CM

## 2021-08-31 DIAGNOSIS — T78.40XS ALLERGIC REACTION, SEQUELA: ICD-10-CM

## 2021-08-31 DIAGNOSIS — L50.9 URTICARIA: Primary | ICD-10-CM

## 2021-08-31 RX ORDER — EPINEPHRINE 0.3 MG/.3ML
0.3 INJECTION SUBCUTANEOUS ONCE
Qty: 0.3 ML | Refills: 0 | Status: SHIPPED | OUTPATIENT
Start: 2021-08-31 | End: 2021-08-31

## 2021-08-31 NOTE — TELEPHONE ENCOUNTER
[facility] Call Center    Phone Message    May a detailed message be left on voicemail: yes     Reason for Call: Other: Patient returned call and stated she is available between 10-11AM today or any time after 12:30.     Action Taken: Message routed to:  [facility]    Travel Screening: Not Applicable

## 2021-08-31 NOTE — TELEPHONE ENCOUNTER
Results for orders placed or performed in visit on 08/30/21 (from the past 48 hour(s))   US Pelvic w/ Transvaginal    Narrative    EXAMINATION: US PELVIC COMPLETE WITH TRANSVAGINAL, 8/30/2021 5:00 PM     COMPARISON: Pelvic ultrasound 9/22/2020    HISTORY: IUD placement string check; IUD check up    TECHNIQUE: The pelvis was scanned in standard fashion with  transabdominal and transvaginal transducer(s) using both grey scale  and limited color Doppler techniques.    FINDINGS:  The uterus measures 8.7 x 4.6 x 3.5 cm, and there is no evidence of a  focal fibroid. IUD in place within the uterine fundus. The endometrium  is within normal limits and measures 3 millimeter. There is trace free  fluid in the pelvis at the cul-de-sac.    The right ovary measures 3.5 x 3.3 x 2.6 and the left ovary measures  2.6 x 2.9 x 2.5. 2.1 x 1.5 x 2.2 cm presumed resolving right  hemorrhagic cyst. Consider follow-up in 3 months 2 attempt to ensure  resolution. Multiple ovarian follicles. There is normal blood flow to  the ovaries.      Impression    IMPRESSION:   Decreasing right-sided possible hemorrhagic cyst. 3 month follow-up  recommended to ensure complete resolution to exclude complex  neoplastic mass. Trace free fluid in the pelvis. IUD. Otherwise  unremarkable exam.    I have personally reviewed the examination and initial interpretation  and I agree with the findings.    BRANDY SALAZAR MD         SYSTEM ID:  [id number]   August 31, 2021  8:32 AM  I tried to call to discuss test results. Please obtain a time frame and number where I may reach her.  Dany Coleman MD  11:00 AM   I was able to reach her to review US right possible hemorrhagic cyst. She has noted pain on the right for some time intermittently throughout her cycle. Recent discontinue of OCP and switch to IUD. I offered GYN assessment due to past HX of right pelvic pain and next steps but also indicated need for US follow-up in 3 months.  Dany Coleman MD    I sent the following to coordinators:  Please call to assist with coordination of GYN referral now and US in 3 months.   She is available after 12:30 pm [phone]   Dany Coleman MD

## 2021-08-31 NOTE — RESULT ENCOUNTER NOTE
Dear Leonor Sears   I was able to reach you to review US right possible hemorrhagic cyst. You have noted pain on the right for some time intermittently throughout cycle. Recent discontinue of OCP and switch to IUD ( in good position in uterus). I offered GYN assessment due to past concern of right pelvic pain and next steps but also indicated need for US follow-up in 3 months.  Dany Coleman MD

## 2021-08-31 NOTE — TELEPHONE ENCOUNTER
RN left voice message, asking patient to call [facility] back and give best time frame and number, so Dr. Coleman can discuss results.    Kelsey Leon RN on 8/31/2021 at 9:25 AM

## 2021-10-11 ENCOUNTER — HEALTH MAINTENANCE LETTER (OUTPATIENT)
Age: 31
End: 2021-10-11

## 2021-10-13 NOTE — PROGRESS NOTES
[facility] Dermatology Note  Encounter Date: Oct 15, 2021  Office Visit     Dermatology Problem List:  # Multiple benign nevi.    ____________________________________________    Assessment & Plan:    # Multiple benign nevi   - No concerning lesions today --> will monitor nevi she noted today with photos and she was advised to let us know of any changes. One of the lesions on R earlobe may be blue nevus but very even and homogenous pigmentation.  - Counseled on ABCDEs of melanoma and sun protection - recommend SPF 30 or higher with frequent application  - Return sooner if noticing changing or symptomatic lesions    Procedures Performed:   None      Follow-up: 1-2 year(s) in-person, or earlier for new or changing lesions    Staff and Scribe:     Scribe Disclosure:  I, Freya Loving, am serving as a scribe to document services personally performed by Carolin Whaley MD based on data collection and the provider's statements to me.     Provider Disclosure:   The documentation recorded by the scribe accurately reflects the services I personally performed and the decisions made by me.    Carolin Whaley MD    Department of Dermatology  [facility]: Phone: [phone], Fax: [phone]  10/16/2021     ____________________________________________    CC: Skin Check (multiple moles )    HPI:  Ms. Leonor Sears is a(n) 30 year old female who presents today as a new patient for FBSE. The patient reports a few changing spots/moles on her shin, thigh, collarbone, and ear. She states she has had the mole on her shin for around 7 years, but notes it has recently grown. She denies personal or familial history of skin cancer. She states she wears sunscreen when going outdoors.    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    Patient is otherwise feeling well, without additional skin concerns.    Labs  Reviewed:  N/A    Physical Exam:  Vitals: There were no vitals taken for this visit.  SKIN: Total skin excluding the undergarment areas was performed. The exam included the head/face, neck, both arms, chest, back, abdomen, both legs, digits and/or nails.   - Multiple regular brown pigmented macules and papules are identified on the trunk and extremities.   - Right earlobe with two 2 mm homogenous macules, one more medium brown to dark brown and one more bluish.  - Central upper chest 4 mm fleshy medium brown papule   - Left shin 2 mm medium to dark brown macule, dermoscopy centrally darker brown  - No other lesions of concern on areas examined.     Medications:  Current Outpatient Medications   Medication     Continuous Blood Gluc  (DEXCOM G6 ) BRITNEY     Continuous Blood Gluc Sensor (DEXCOM G6 SENSOR) MISC     Continuous Blood Gluc Transmit (DEXCOM G6 TRANSMITTER) MISC     famotidine (PEPCID) 40 MG tablet     meclizine (ANTIVERT) 12.5 MG tablet     naproxen (NAPROSYN) 500 MG tablet     norethindrone (MICRONOR) 0.35 MG tablet     No current facility-administered medications for this visit.      Past Medical History:   Patient Active Problem List   Diagnosis     TMJ (temporomandibular joint syndrome)     Migraine without aura and without status migrainosus, not intractable     Family history of diabetes mellitus     Gastroesophageal reflux disease with esophagitis without hemorrhage     Elevated BP without diagnosis of hypertension     Encounter for IUD insertion     Multiple dysplastic nevi     Urticaria     Past Medical History:   Diagnosis Date     Dysmenorrhea      Frequent PVCs (H) 04/2013    s/p ablation     Migraine without aura and without status migrainosus, not intractable         CC Dany Coleman MD  [address] [place] [id number] on close of this encounter.

## 2021-10-15 ENCOUNTER — OFFICE VISIT (OUTPATIENT)
Dept: DERMATOLOGY | Facility: CLINIC | Age: 31
End: 2021-10-15
Attending: FAMILY MEDICINE
Payer: COMMERCIAL

## 2021-10-15 DIAGNOSIS — D22.9 MULTIPLE BENIGN NEVI: ICD-10-CM

## 2021-10-15 PROCEDURE — 99202 OFFICE O/P NEW SF 15 MIN: CPT | Performed by: DERMATOLOGY

## 2021-10-15 ASSESSMENT — PAIN SCALES - GENERAL: PAINLEVEL: NO PAIN (0)

## 2021-10-15 NOTE — NURSING NOTE
Chief Complaint   Patient presents with     Skin Check     multiple moles        Pt was referred by her pcp.  She states she has a mole on her left shin that she states has changed shaped.  PT states she has an abnormal looking one on her neck.  She has some on her ear that she has had for a decade.   Freya Pérez LPN on 10/15/2021 at 2:35 PM

## 2021-10-15 NOTE — LETTER
10/15/2021       RE: Leonor Sears  [address]  [facility] [address]     Dear Colleague,    Thank you for referring your patient, Leonor Sears, to the [facility]. Please see a copy of my visit note below.    [facility] Dermatology Note  Encounter Date: Oct 15, 2021  Office Visit     Dermatology Problem List:  # Multiple benign nevi.    ____________________________________________    Assessment & Plan:    # Multiple benign nevi   - No concerning lesions today --> will monitor nevi she noted today with photos and she was advised to let us know of any changes. One of the lesions on R earlobe may be blue nevus but very even and homogenous pigmentation.  - Counseled on ABCDEs of melanoma and sun protection - recommend SPF 30 or higher with frequent application  - Return sooner if noticing changing or symptomatic lesions    Procedures Performed:   None      Follow-up: 1-2 year(s) in-person, or earlier for new or changing lesions    Staff and Scribe:     Scribe Disclosure:  I, Freya Loving, am serving as a scribe to document services personally performed by Carolin Whaley MD based on data collection and the provider's statements to me.     Provider Disclosure:   The documentation recorded by the scribe accurately reflects the services I personally performed and the decisions made by me.    Carolin Whaley MD    Department of Dermatology  [facility]: Phone: [phone], Fax: [phone]  10/16/2021     ____________________________________________    CC: Skin Check (multiple moles )    HPI:  Ms. Leonor Sears is a(n) 30 year old female who presents today as a new patient for FBSE. The patient reports a few changing spots/moles on her shin, thigh, collarbone, and ear. She states she has had the  mole on her shin for around 7 years, but notes it has recently grown. She denies personal or familial history of skin cancer. She states she wears sunscreen when going outdoors.    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    Patient is otherwise feeling well, without additional skin concerns.    Labs Reviewed:  N/A    Physical Exam:  Vitals: There were no vitals taken for this visit.  SKIN: Total skin excluding the undergarment areas was performed. The exam included the head/face, neck, both arms, chest, back, abdomen, both legs, digits and/or nails.   - Multiple regular brown pigmented macules and papules are identified on the trunk and extremities.   - Right earlobe with two 2 mm homogenous macules, one more medium brown to dark brown and one more bluish.  - Central upper chest 4 mm fleshy medium brown papule   - Left shin 2 mm medium to dark brown macule, dermoscopy centrally darker brown  - No other lesions of concern on areas examined.     Medications:  Current Outpatient Medications   Medication     Continuous Blood Gluc  (DEXCOM G6 ) BRITNEY     Continuous Blood Gluc Sensor (DEXCOM G6 SENSOR) MISC     Continuous Blood Gluc Transmit (DEXCOM G6 TRANSMITTER) MISC     famotidine (PEPCID) 40 MG tablet     meclizine (ANTIVERT) 12.5 MG tablet     naproxen (NAPROSYN) 500 MG tablet     norethindrone (MICRONOR) 0.35 MG tablet     No current facility-administered medications for this visit.      Past Medical History:   Patient Active Problem List   Diagnosis     TMJ (temporomandibular joint syndrome)     Migraine without aura and without status migrainosus, not intractable     Family history of diabetes mellitus     Gastroesophageal reflux disease with esophagitis without hemorrhage     Elevated BP without diagnosis of hypertension     Encounter for IUD insertion     Multiple dysplastic nevi     Urticaria     Past Medical History:   Diagnosis Date     Dysmenorrhea      Frequent PVCs (H) 04/2013    s/p  ablation     Migraine without aura and without status migrainosus, not intractable         CC Dany Coleman MD  [address] [facility] [address]  [place] [address] on close of this encounter.

## 2021-10-15 NOTE — PATIENT INSTRUCTIONS
Patient Education     Checking for Skin Cancer  You can find cancer early by checking your skin each month. There are 3 kinds of skin cancer. They are melanoma, basal cell carcinoma, and squamous cell carcinoma. Doing monthly skin checks is the best way to find new marks or skin changes. Follow the instructions below for checking your skin.   The ABCDEs of checking moles for melanoma   Check your moles or growths for signs of melanoma using ABCDE:     Asymmetry: the sides of the mole or growth don t match    Border: the edges are ragged, notched, or blurred    Color: the color within the mole or growth varies    Diameter: the mole or growth is larger than 6 mm (size of a pencil eraser)    Evolving: the size, shape, or color of the mole or growth is changing (evolving is not shown in the images below)    Checking for other types of skin cancer  Basal cell carcinoma or squamous cell carcinoma have symptoms such as:       A spot or mole that looks different from all other marks on your skin    Changes in how an area feels, such as itching, tenderness, or pain    Changes in the skin's surface, such as oozing, bleeding, or scaliness    A sore that does not heal    New swelling or redness beyond the border of a mole    Who s at risk?  Anyone can get skin cancer. But you are at greater risk if you have:     Fair skin, light-colored hair, or light-colored eyes    Many moles or abnormal moles on your skin    A history of sunburns from sunlight or tanning beds    A family history of skin cancer    A history of exposure to radiation or chemicals    A weakened immune system  If you have had skin cancer in the past, you are at risk for recurring skin cancer.   How to check your skin  Do your monthly skin checkups in front of a full-length mirror. Check all parts of your body, including your:     Head (ears, face, neck, and scalp)    Torso (front, back, and sides)    Arms (tops, undersides, upper, and lower armpits)    Hands  (palms, backs, and fingers, including under the nails)    Buttocks and genitals    Legs (front, back, and sides)    Feet (tops, soles, toes, including under the nails, and between toes)  If you have a lot of moles, take digital photos of them each month. Make sure to take photos both up close and from a distance. These can help you see if any moles change over time.   Most skin changes are not cancer. But if you see any changes in your skin, call your doctor right away. Only he or she can diagnose a problem. If you have skin cancer, seeing your doctor can be the first step toward getting the treatment that could save your life.   [employer] last reviewed this educational content on 4/1/2019 2000-2020 [employer]. [address] [place] [address]. All rights reserved. This information is not intended as a substitute for professional medical care. Always follow your healthcare professional's instructions.       When should I call my doctor?    If you are worsening or not improving, please, contact us or seek urgent care as noted below.     Who should I call with questions (adults)?    [facility] (adult and pediatric): [phone]    [facility] (adult): [phone]    For urgent needs outside of business hours call the [facility] at [phone] and ask for the dermatology resident on call to be paged    If this is a medical emergency and you are unable to reach an ER, Call [phone]    Who should I call with questions (pediatric)?  [facility]- Pediatric Dermatology  Dr. Akosua Hidalgo, Dr. Laura Del Castillo, Dr. Esther Moise, MARK Navas, Dr. Melody Ashraf, Dr. Lucille Laurent & Dr. Deniz Zamudio  Non-urgent nurse triage line; [phone]- Eloina and Leonarda MUNOZ Care Coordinatorterra Luo (/Complex ) [phone]    If you need a prescription refill, please contact your  pharmacy. Refills are approved or denied by our Physicians during normal business hours, Monday through Fridays  Per office policy, refills will not be granted if you have not been seen within the past year (or sooner depending on your child's condition)    Scheduling Information:  Pediatric Appointment Scheduling and Call Center [phone]  Radiology Scheduling- [phone]  Sedation Unit Scheduling- [phone]  [place] Scheduling- [facility] [phone]; Pediatric Dermatology [phone]  Main  Services: [phone]  Citizen of [place]: [phone]  Citizen of [place]: [phone]  [place]mong/Af[place]ub[place]: [phone]  Preadmission Nursing Department Fax Number: [phone] (Fax all pre-operative paperwork to this number)    For urgent matters arising during evenings, weekends, or holidays that cannot wait for normal business hours please call [phone] and ask for the dermatology resident on call to be paged.

## 2021-11-30 ENCOUNTER — ANCILLARY PROCEDURE (OUTPATIENT)
Dept: ULTRASOUND IMAGING | Facility: CLINIC | Age: 31
End: 2021-11-30
Attending: FAMILY MEDICINE
Payer: COMMERCIAL

## 2021-11-30 DIAGNOSIS — N83.201 HEMORRHAGIC CYST OF RIGHT OVARY: ICD-10-CM

## 2021-11-30 PROCEDURE — 76856 US EXAM PELVIC COMPLETE: CPT | Mod: GC | Performed by: RADIOLOGY

## 2021-11-30 PROCEDURE — 76830 TRANSVAGINAL US NON-OB: CPT | Mod: GC | Performed by: RADIOLOGY

## 2021-12-01 NOTE — RESULT ENCOUNTER NOTE
Dear Leonor Sears    Pelvic ultrasound shows well positioned IUD in uterus and no current concerning cysts.  Good result.  Best wishes,  Dany Coleman MD

## 2022-09-24 ENCOUNTER — HEALTH MAINTENANCE LETTER (OUTPATIENT)
Age: 32
End: 2022-09-24

## 2023-10-14 ENCOUNTER — HEALTH MAINTENANCE LETTER (OUTPATIENT)
Age: 33
End: 2023-10-14

## 2024-12-01 ENCOUNTER — HEALTH MAINTENANCE LETTER (OUTPATIENT)
Age: 34
End: 2024-12-01